# Patient Record
Sex: FEMALE | Race: BLACK OR AFRICAN AMERICAN | Employment: UNEMPLOYED | URBAN - METROPOLITAN AREA
[De-identification: names, ages, dates, MRNs, and addresses within clinical notes are randomized per-mention and may not be internally consistent; named-entity substitution may affect disease eponyms.]

---

## 2019-02-07 ENCOUNTER — OFFICE VISIT (OUTPATIENT)
Dept: URGENT CARE | Facility: CLINIC | Age: 44
End: 2019-02-07

## 2019-02-07 VITALS
WEIGHT: 128 LBS | RESPIRATION RATE: 18 BRPM | HEART RATE: 92 BPM | TEMPERATURE: 98 F | HEIGHT: 65 IN | DIASTOLIC BLOOD PRESSURE: 72 MMHG | BODY MASS INDEX: 21.33 KG/M2 | SYSTOLIC BLOOD PRESSURE: 106 MMHG | OXYGEN SATURATION: 100 %

## 2019-02-07 DIAGNOSIS — M54.42 LEFT-SIDED LOW BACK PAIN WITH LEFT-SIDED SCIATICA, UNSPECIFIED CHRONICITY: Primary | ICD-10-CM

## 2019-02-07 PROCEDURE — 99203 PR OFFICE/OUTPT VISIT, NEW, LEVL III, 30-44 MIN: ICD-10-PCS | Mod: 25,S$GLB,, | Performed by: PHYSICIAN ASSISTANT

## 2019-02-07 PROCEDURE — 99203 OFFICE O/P NEW LOW 30 MIN: CPT | Mod: 25,S$GLB,, | Performed by: PHYSICIAN ASSISTANT

## 2019-02-07 PROCEDURE — 96372 THER/PROPH/DIAG INJ SC/IM: CPT | Mod: S$GLB,,, | Performed by: PHYSICIAN ASSISTANT

## 2019-02-07 PROCEDURE — 96372 PR INJECTION,THERAP/PROPH/DIAG2ST, IM OR SUBCUT: ICD-10-PCS | Mod: S$GLB,,, | Performed by: PHYSICIAN ASSISTANT

## 2019-02-07 RX ORDER — CYCLOBENZAPRINE HCL 10 MG
10 TABLET ORAL NIGHTLY
Qty: 30 TABLET | Refills: 0 | Status: SHIPPED | OUTPATIENT
Start: 2019-02-07 | End: 2019-06-14

## 2019-02-07 RX ORDER — KETOROLAC TROMETHAMINE 30 MG/ML
30 INJECTION, SOLUTION INTRAMUSCULAR; INTRAVENOUS
Status: COMPLETED | OUTPATIENT
Start: 2019-02-07 | End: 2019-02-07

## 2019-02-07 RX ORDER — PREDNISONE 20 MG/1
40 TABLET ORAL DAILY
Qty: 10 TABLET | Refills: 0 | Status: SHIPPED | OUTPATIENT
Start: 2019-02-07 | End: 2019-02-12

## 2019-02-07 RX ORDER — METHOCARBAMOL 500 MG/1
1000 TABLET, FILM COATED ORAL 3 TIMES DAILY
Qty: 20 TABLET | Refills: 0 | Status: SHIPPED | OUTPATIENT
Start: 2019-02-07 | End: 2019-02-07

## 2019-02-07 RX ADMIN — KETOROLAC TROMETHAMINE 30 MG: 30 INJECTION, SOLUTION INTRAMUSCULAR; INTRAVENOUS at 02:02

## 2019-02-07 NOTE — PROGRESS NOTES
"Subjective:       Patient ID: Lea Springer is a 43 y.o. female.    Vitals:  height is 5' 5" (1.651 m) and weight is 58.1 kg (128 lb). Her temperature is 98.1 °F (36.7 °C). Her blood pressure is 106/72 and her pulse is 92. Her respiration is 18 and oxygen saturation is 100%.     Chief Complaint: Leg Pain (left thigh pain x 1 month )    Patient presents with  today.  is main historian and states that patient has been "overworking" herself at the gym and at work. Patient reports no prior injury, but "might not have fully recovered from a pulled muscle while exercising at the gym". Patient states that she used to be able to do squats and stretch everyday, but left lower back and leg pain has gotten worse. Patient states that she has been tolerating the pain by compensating on her right leg. Patient describes the pain as sharp that radiates from her left lower back down to the left leg. Pain is worse with sitting and walking, better with standing still. Patient reports numbness and tingling when seated for a long period of time. No fever, CP, SOB or leg swelling.      Leg Pain    The incident occurred more than 1 week ago. There was no injury mechanism. The pain is present in the left leg and left thigh. The pain is at a severity of 7/10. The pain is moderate. She reports no foreign bodies present. The symptoms are aggravated by movement and weight bearing. Treatments tried: motrin, baby aspirin. The treatment provided no relief.       Constitution: Negative for fatigue.   HENT: Negative for facial swelling and facial trauma.    Neck: Negative for neck stiffness.   Cardiovascular: Negative for chest trauma.   Eyes: Negative for eye trauma, double vision and blurred vision.   Gastrointestinal: Negative for abdominal trauma, abdominal pain and rectal bleeding.   Genitourinary: Negative for hematuria, missed menses, genital trauma and pelvic pain.   Musculoskeletal: Positive for pain. Negative for trauma, " joint swelling and abnormal ROM of joint.   Skin: Negative for color change, wound, abrasion, laceration and bruising.   Neurological: Negative for dizziness, history of vertigo, light-headedness, coordination disturbances, altered mental status and loss of consciousness.   Hematologic/Lymphatic: Negative for history of bleeding disorder.   Psychiatric/Behavioral: Negative for altered mental status.       Objective:      Physical Exam   Constitutional: She is oriented to person, place, and time. Vital signs are normal. She appears well-developed and well-nourished. She is active and cooperative. No distress.   HENT:   Head: Normocephalic and atraumatic.   Nose: Nose normal.   Mouth/Throat: Oropharynx is clear and moist and mucous membranes are normal.   Eyes: Conjunctivae and lids are normal.   Neck: Trachea normal, normal range of motion, full passive range of motion without pain and phonation normal. Neck supple.   Cardiovascular: Normal rate, regular rhythm, normal heart sounds, intact distal pulses and normal pulses.   Pulmonary/Chest: Effort normal and breath sounds normal. No accessory muscle usage or stridor. She has no decreased breath sounds. She has no wheezes. She has no rhonchi. She has no rales.   Abdominal: Soft. Normal appearance and bowel sounds are normal. She exhibits no abdominal bruit, no pulsatile midline mass and no mass.   Musculoskeletal: She exhibits no edema or deformity.        Lumbar back: She exhibits tenderness, pain and spasm. She exhibits normal range of motion, no bony tenderness, no swelling, no edema, no deformity and no laceration.        Left upper leg: She exhibits tenderness. She exhibits no bony tenderness, no swelling, no edema, no deformity and no laceration.        Left lower leg: She exhibits no bony tenderness, no swelling, no edema, no deformity and no laceration.   Lymphadenopathy:     She has no cervical adenopathy.   Neurological: She is alert and oriented to person,  place, and time. She has normal strength and normal reflexes. No cranial nerve deficit or sensory deficit. She displays a negative Romberg sign. Gait normal.   Skin: Skin is warm, dry and intact. She is not diaphoretic.   Psychiatric: She has a normal mood and affect. Her speech is normal and behavior is normal. Judgment and thought content normal. Cognition and memory are normal.   Nursing note and vitals reviewed.      Assessment:       1. Left-sided low back pain with left-sided sciatica, unspecified chronicity        Plan:         Left-sided low back pain with left-sided sciatica, unspecified chronicity  -     Ambulatory Referral to Back [facility] Spine Clinic    Other orders  -     ketorolac injection 30 mg  -     Discontinue: methocarbamol (ROBAXIN) 500 MG Tab; Take 2 tablets (1,000 mg total) by mouth 3 (three) times daily.  Dispense: 20 tablet; Refill: 0  -     cyclobenzaprine (FLEXERIL) 10 MG tablet; Take 1 tablet (10 mg total) by mouth every evening.  Dispense: 30 tablet; Refill: 0  -     predniSONE (DELTASONE) 20 MG tablet; Take 2 tablets (40 mg total) by mouth once daily. for 5 days  Dispense: 10 tablet; Refill: 0      Patient Instructions     If you were prescribed a narcotic or controlled medication, do not drive or operate heavy equipment or machinery while taking these medications.  You must understand that you've received an Urgent Care treatment only and that you may be released before all your medical problems are known or treated. You, the patient, will arrange for follow up care as instructed.  Follow up with your PCP or specialty clinic as directed in the next 1-2 weeks if not improved or as needed.  You can call [phone] to schedule an appointment with the appropriate provider.  If your condition worsens we recommend that you receive another evaluation at the emergency room immediately or contact your primary medical clinics after hours call service to discuss your concerns.  Please return here  or go to the Emergency Department for any concerns or worsening of condition.    Toradol shot given in clinic today for pain relief. Take Prednisone (steroid by mouth) as prescribed to reduce the inflammation. Please take OTC NSAID (Mobic or Naprosyn) as needed for pain relief. Take muscle relaxer as needed.     Referral to Back and Spine Clinic given to patient today and verbalized understanding. Patient is stable and in a comfortable standing position upon discharge.     Possible Causes of Low Back or Leg Pain    The symptoms in your back or leg may be due to pressure on a nerve. This pressure may be caused by a damaged disk or by abnormal bone growth. Either way, you may feel pain, burning, tingling, or numbness. If you have pressure on a nerve that connects to the sciatic nerve, pain may shoot down your leg.    Pressure from the disk  Constant wear and tear can weaken a disk over time and cause back pain. The disk can then be damaged by a sudden movement or injury. If its soft center begins to bulge, the disk may press on a nerve. Or the outside of the disk may tear, and the soft center may squeeze through and pinch a nerve.    Pressure from bone  As a disk wears out, the vertebrae right above and below the disk begin to touch. This can put pressure on a nerve. Often, abnormal bone (called bone spurs) grows where the vertebrae rub against each other. This can cause the foramen or the spinal canal to narrow (called stenosis) and press against a nerve.  Date Last Reviewed: 10/4/2015  © 8415-9983 [employer]. [address] [place] [address], [place] [address]. All rights reserved. This information is not intended as a substitute for professional medical care. Always follow your healthcare professional's instructions.        Self-Care for Low Back Pain    Most people have low back pain now and then. In many cases, it isnt serious and self-care can help. Sometimes low back pain can be a sign of a bigger  problem. Call your healthcare provider if your pain returns often or gets worse over time. For the long-term care of your back, get regular exercise, lose any excess weight and learn good posture.  Take a short rest  Lying down during the day may be beneficial for short periods of time if severe pain increases with sitting or standing. Long-term bed rest could be detrimental.  Reduce pain and swelling  Cold reduces swelling. Both cold and heat can reduce pain. Protect your skin by placing a towel between your body and the ice or heat source.  · For the first few days, apply an ice pack for 15 to 20 minutes .  · After the first few days, try heat for 15 minutes at a time to ease pain. Never sleep on a heating pad.  · Over-the-counter medicine can help control pain and swelling. Try aspirin or ibuprofen.  Exercise  Exercise can help your back heal. It also helps your back get stronger and more flexible, preventing any reinjury. Ask your healthcare provider about specific exercises for your back.  Use good posture to avoid reinjury  · When moving, bend at the hips and knees. Dont bend at the waist or twist around.  · When lifting, keep the object close to your body. Dont try to lift more than you can handle.  · When sitting, keep your lower back supported. Use a rolled-up towel as needed.  Seek immediate medical care if:  · Youre unable to stand or walk.  · You have a temperature over 100.4°F (38.0°C)  · You have frequent, painful, or bloody urination.  · You have severe abdominal pain.  · You have a sharp, stabbing pain.  · Your pain is constant.  · You have pain or numbness in your leg.  · You feel pain in a new area of your back.  · You notice that the pain isnt decreasing after more than a week.   Date Last Reviewed: 9/29/2015  © 3246-8625 [employer]. [address] [place] [address], [place] [address]. All rights reserved. This information is not intended as a substitute for professional medical care.  Always follow your healthcare professional's instructions.

## 2019-02-07 NOTE — PATIENT INSTRUCTIONS
If you were prescribed a narcotic or controlled medication, do not drive or operate heavy equipment or machinery while taking these medications.  You must understand that you've received an Urgent Care treatment only and that you may be released before all your medical problems are known or treated. You, the patient, will arrange for follow up care as instructed.  Follow up with your PCP or specialty clinic as directed in the next 1-2 weeks if not improved or as needed.  You can call [phone] to schedule an appointment with the appropriate provider.  If your condition worsens we recommend that you receive another evaluation at the emergency room immediately or contact your primary medical clinics after hours call service to discuss your concerns.  Please return here or go to the Emergency Department for any concerns or worsening of condition.    Toradol shot given in clinic today for pain relief. Take Prednisone (steroid by mouth) as prescribed to reduce the inflammation. Please take OTC NSAID (Mobic or Naprosyn) as needed for pain relief. Take muscle relaxer as needed.     Referral to Back and Spine Clinic given to patient today and verbalized understanding. Patient is stable and in a comfortable standing position upon discharge.     Possible Causes of Low Back or Leg Pain    The symptoms in your back or leg may be due to pressure on a nerve. This pressure may be caused by a damaged disk or by abnormal bone growth. Either way, you may feel pain, burning, tingling, or numbness. If you have pressure on a nerve that connects to the sciatic nerve, pain may shoot down your leg.    Pressure from the disk  Constant wear and tear can weaken a disk over time and cause back pain. The disk can then be damaged by a sudden movement or injury. If its soft center begins to bulge, the disk may press on a nerve. Or the outside of the disk may tear, and the soft center may squeeze through and pinch a nerve.    Pressure from  bone  As a disk wears out, the vertebrae right above and below the disk begin to touch. This can put pressure on a nerve. Often, abnormal bone (called bone spurs) grows where the vertebrae rub against each other. This can cause the foramen or the spinal canal to narrow (called stenosis) and press against a nerve.  Date Last Reviewed: 10/4/2015  © 5247-9766 [employer]. [address] [place] [address], [place] [address]. All rights reserved. This information is not intended as a substitute for professional medical care. Always follow your healthcare professional's instructions.        Self-Care for Low Back Pain    Most people have low back pain now and then. In many cases, it isnt serious and self-care can help. Sometimes low back pain can be a sign of a bigger problem. Call your healthcare provider if your pain returns often or gets worse over time. For the long-term care of your back, get regular exercise, lose any excess weight and learn good posture.  Take a short rest  Lying down during the day may be beneficial for short periods of time if severe pain increases with sitting or standing. Long-term bed rest could be detrimental.  Reduce pain and swelling  Cold reduces swelling. Both cold and heat can reduce pain. Protect your skin by placing a towel between your body and the ice or heat source.  · For the first few days, apply an ice pack for 15 to 20 minutes .  · After the first few days, try heat for 15 minutes at a time to ease pain. Never sleep on a heating pad.  · Over-the-counter medicine can help control pain and swelling. Try aspirin or ibuprofen.  Exercise  Exercise can help your back heal. It also helps your back get stronger and more flexible, preventing any reinjury. Ask your healthcare provider about specific exercises for your back.  Use good posture to avoid reinjury  · When moving, bend at the hips and knees. Dont bend at the waist or twist around.  · When lifting, keep the object close to  your body. Dont try to lift more than you can handle.  · When sitting, keep your lower back supported. Use a rolled-up towel as needed.  Seek immediate medical care if:  · Youre unable to stand or walk.  · You have a temperature over 100.4°F (38.0°C)  · You have frequent, painful, or bloody urination.  · You have severe abdominal pain.  · You have a sharp, stabbing pain.  · Your pain is constant.  · You have pain or numbness in your leg.  · You feel pain in a new area of your back.  · You notice that the pain isnt decreasing after more than a week.   Date Last Reviewed: 9/29/2015  © 3439-7487 [employer]. [address] [place] [address], [place] [address]. All rights reserved. This information is not intended as a substitute for professional medical care. Always follow your healthcare professional's instructions.

## 2019-02-12 ENCOUNTER — TELEPHONE (OUTPATIENT)
Dept: SPINE | Facility: CLINIC | Age: 44
End: 2019-02-12

## 2019-02-12 DIAGNOSIS — M54.5 LOW BACK PAIN, UNSPECIFIED BACK PAIN LATERALITY, UNSPECIFIED CHRONICITY, WITH SCIATICA PRESENCE UNSPECIFIED: Primary | ICD-10-CM

## 2019-03-28 ENCOUNTER — OFFICE VISIT (OUTPATIENT)
Dept: SPINE | Facility: CLINIC | Age: 44
End: 2019-03-28
Payer: MEDICAID

## 2019-03-28 VITALS
TEMPERATURE: 99 F | SYSTOLIC BLOOD PRESSURE: 125 MMHG | HEIGHT: 65 IN | BODY MASS INDEX: 21.33 KG/M2 | HEART RATE: 74 BPM | WEIGHT: 128 LBS | DIASTOLIC BLOOD PRESSURE: 58 MMHG

## 2019-03-28 DIAGNOSIS — M54.16 LEFT LUMBAR RADICULITIS: Primary | ICD-10-CM

## 2019-03-28 PROCEDURE — 99999 PR PBB SHADOW E&M-EST. PATIENT-LVL IV: CPT | Mod: PBBFAC,,, | Performed by: PHYSICIAN ASSISTANT

## 2019-03-28 PROCEDURE — 99204 PR OFFICE/OUTPT VISIT, NEW, LEVL IV, 45-59 MIN: ICD-10-PCS | Mod: S$PBB,,, | Performed by: PHYSICIAN ASSISTANT

## 2019-03-28 PROCEDURE — 99204 OFFICE O/P NEW MOD 45 MIN: CPT | Mod: S$PBB,,, | Performed by: PHYSICIAN ASSISTANT

## 2019-03-28 PROCEDURE — 99999 PR PBB SHADOW E&M-EST. PATIENT-LVL IV: ICD-10-PCS | Mod: PBBFAC,,, | Performed by: PHYSICIAN ASSISTANT

## 2019-03-28 PROCEDURE — 99214 OFFICE O/P EST MOD 30 MIN: CPT | Mod: PBBFAC | Performed by: PHYSICIAN ASSISTANT

## 2019-03-28 RX ORDER — ORPHENADRINE CITRATE 100 MG/1
100 TABLET, EXTENDED RELEASE ORAL 2 TIMES DAILY PRN
Qty: 20 TABLET | Refills: 0 | Status: SHIPPED | OUTPATIENT
Start: 2019-03-28 | End: 2019-04-07

## 2019-03-28 RX ORDER — MELOXICAM 7.5 MG/1
TABLET ORAL
COMMUNITY
Start: 2019-03-07

## 2019-03-28 RX ORDER — TRAMADOL HYDROCHLORIDE 50 MG/1
50 TABLET ORAL EVERY 8 HOURS PRN
Qty: 30 TABLET | Refills: 0 | Status: SHIPPED | OUTPATIENT
Start: 2019-03-28 | End: 2019-04-07

## 2019-03-28 NOTE — PROGRESS NOTES
Subjective:      Patient ID: Lea Springer is a 43 y.o. female.    Chief Complaint: Leg Pain      HPI  ([place])    She is otherwise healthy.     Seen by UC on 2/7/19 for back pain. Given flexeril and prednisone. No relief with the po prednisone. Had 1 day relief with toradol injection. Was seen in outside ED and had XR (told it was normal). She was given mobic 7.5 and norflex- these helped a little. Was given dose pack on 3/21/19 with no improvement.     Woke up with left leg pain in August of last year- this was intermittent. Pain got progressively worse. Now with constant left buttock and posterior leg pain mostly to behind her knee. No LBP or right leg pain. Pain is worse with sitting/driving. Pain also worse at night. Pain is better with leaning forward and laying on her stomach. She rates her pain as a 9 on a scale of 1-10. No known injury. No previous back issues. Pain is stabbing and she has spasms. No numbness, tingling, or weakness.     No PT, injections or surgery on her back. Medications as above.       Review of Systems   Constitution: Negative for fever, malaise/fatigue, night sweats, weight gain and weight loss.   HENT: Negative for hearing loss, nosebleeds and odynophagia.    Eyes: Positive for blurred vision. Negative for double vision.   Cardiovascular: Negative for chest pain, irregular heartbeat and palpitations.   Respiratory: Negative for cough, hemoptysis, shortness of breath and wheezing.    Endocrine: Negative for cold intolerance and polydipsia.   Hematologic/Lymphatic: Does not bruise/bleed easily.   Skin: Negative for dry skin, poor wound healing, rash and suspicious lesions.   Musculoskeletal:        See HPI for pertinent positives.   Gastrointestinal: Positive for nausea. Negative for bloating, abdominal pain, constipation, diarrhea, hematochezia, melena and vomiting.   Genitourinary: Negative for bladder incontinence, dysuria, hematuria, hesitancy and incomplete emptying.    Neurological: Negative for disturbances in coordination, dizziness, focal weakness, headaches, loss of balance, numbness, paresthesias, seizures and weakness.   Psychiatric/Behavioral: Negative for depression and hallucinations. The patient is not nervous/anxious.            Objective:        General: Lea is well-developed, well-nourished, appears stated age, in no acute distress, alert and oriented to time, place and person.     General    Vitals reviewed.  Constitutional: She is oriented to person, place, and time. She appears well-developed and well-nourished.   Pulmonary/Chest: Effort normal.   Abdominal: She exhibits no distension.   Neurological: She is alert and oriented to person, place, and time.   Psychiatric: She has a normal mood and affect. Her behavior is normal. Judgment and thought content normal.   She is very tearful throughout interview and exam.           Gait: normal, tandem walking is normal and she is able to heel/toe stand.     On exam of the lumbar spine, Inspection of back is normal, No tenderness noted, no left buttock tenderness.     Skin in lumbar region is warm to the touch without visible rashes.     muscle tone normal without spasm, limited range of motion with pain  Pain in flexion.    Strength testing of the bilateral LEs shows  Right hip abduction:  +5/5  Left hip abduction:  +5/5  Right hip flexion:  +5/5  Left hip flexion:  +5/5  Right hip extensors:  +5/5  Left hip extensors:  +5/5  Right quadriceps:  +5/5  Left quadriceps:  +5/5  Right hamstring:  +5/5  Left hamstring:  +5/5  Right dorsiflexion:  +5/5  Left dorsiflexion:  +5/5  Right plantar flexion:  +5/5  Left plantar flexion:  +5/5   Right EHL:  +5/5   Left EHL:  +5/5    negative clonus of bilateral LEs.     positive straight leg raise on left LE with worsening left leg pain. Negative SLR right LE.     DTRs:  Right patellar:  +2     Left patellar:  +2  Right achilles:  +2   Left achilles:  +2    Sensation is grossly  intact in L2, L3, L4, L5, and S1 distribution.    Right hip has no pain with IR/ER. Left hip has no pain with IR/ER.      On exam of bilateral UEs, patient has full painfree ROM with no signs of clubbing, laxity, cyanosis, edema, instability, weakness, or tenderness.         Assessment:       1. Left lumbar radiculitis           Plan:       Orders Placed This Encounter    MRI LUMBAR SPINE WITHOUT CONTRAST    orphenadrine (NORFLEX) 100 mg tablet    traMADol (ULTRAM) 50 mg tablet       8 month history of constant left buttock and posterior leg pain mostly to behind her knee. No LBP or right leg pain. No imaging available. Was told by outside ED that XRs were normal. Pain appears to be radicular in nature. No improvement with medrol dose pack, prednisone, or time. Treatment options reviewed with patient and following plan made:     - MRI of lumbar spine to further evaluate left LE radiculitis.   - Continue norflex prn. Given refill.   - Take mobic 7.5, but can increase to 15mg daily with food. Let me know if/when she needs refill of 15mg.   - Limited prescription for ultram to use for pain.  reviewed and appropriate.   - Depending on MRI, will likely consider injections with pain management.   - Consider PT once pain is improved.   - Will call with MRI results/further plan.   - She has medicaid pending. If she gets medicaid, it will be difficult to get injections. Will follow.     ADDENDUM 4/4/19:  MRI of lumbar spine dated 4/3/19 is personally reviewed and shows disc bulge/facet hypertrophy L4-L5 with mild right foraminal stenosis. Left sided disc L5-S1 in lateral recess that abuts left S1 nerve. Possible fibroids in uterus per radiology.     Left leg pain likely from disc at L5-S1. Recommend left S1 TF SERENITY with pain management. She was advised and wants to proceed. Recommend she f/u with OB/GYN regarding possible uterine fibroids.     Of note, her medicaid is pending and she is under [facility] financial  assistance.     Follow-up: Follow up if symptoms worsen or fail to improve. If there are any questions prior to this, the patient was instructed to contact the office.

## 2019-03-28 NOTE — LETTER
March 28, 2019      Anna Marlow PA-C  [address] [facility] [address]           [facility]  [address], [address]  [facility] [address]  Phone: [phone]  Fax: [phone]          Patient: Lea Springer   MR Number: [id number]   YOB: 1975   Date of Visit: 3/28/2019       Dear Anna Marlow:    Thank you for referring Lea Springer to me for evaluation. Attached you will find relevant portions of my assessment and plan of care.    If you have questions, please do not hesitate to call me. I look forward to following Lea Springer along with you.    Sincerely,    Ynes Olea PA-C    Enclosure  CC:  No Recipients    If you would like to receive this communication electronically, please contact [email or web address] or [phone] to request more information on [employer] access.    For providers and/or their staff who would like to refer a patient to [facility], please contact us through our one-stop-shop provider referral line, [facility], at [phone].    If you feel you have received this communication in error or would no longer like to receive these types of communications, please e-mail [email or web address]

## 2019-03-28 NOTE — PATIENT INSTRUCTIONS
It was a pleasure to meet you today!     I think your left leg pain is coming from your back. I recommend we get an MRI of your lower back to look at this more closely. Depending on what the MRI shows, we may be able to do injections (epidural steroid) in your back.     Take these medications:   - mobic 7.5 from ER- you can take 2 of these (15mg) once a day with food. Let me know if you need a refill.   - norflex twice a day as needed for muscle relaxers. I sent a refill of this to your pharmacy.   - ultram/tramadol every 8 hours as needed for pain. This is a pain medication.     We will call you the day of or the day after your MRI to discuss results. Let me know if you need anything else. I think we should be able to get your pain better.     Ynes

## 2019-04-03 ENCOUNTER — HOSPITAL ENCOUNTER (OUTPATIENT)
Dept: RADIOLOGY | Facility: OTHER | Age: 44
Discharge: HOME | End: 2019-04-03
Attending: PHYSICIAN ASSISTANT
Payer: MEDICAID

## 2019-04-03 ENCOUNTER — TELEPHONE (OUTPATIENT)
Dept: SPINE | Facility: CLINIC | Age: 44
End: 2019-04-03

## 2019-04-03 DIAGNOSIS — M54.16 LEFT LUMBAR RADICULITIS: ICD-10-CM

## 2019-04-03 DIAGNOSIS — M51.26 LUMBAR DISC DISPLACEMENT WITHOUT MYELOPATHY: Primary | ICD-10-CM

## 2019-04-03 DIAGNOSIS — M54.5 LOW BACK PAIN, UNSPECIFIED BACK PAIN LATERALITY, UNSPECIFIED CHRONICITY, WITH SCIATICA PRESENCE UNSPECIFIED: ICD-10-CM

## 2019-04-03 PROCEDURE — 72148 MRI LUMBAR SPINE W/O DYE: CPT | Mod: 26,,, | Performed by: RADIOLOGY

## 2019-04-03 PROCEDURE — 72148 MRI LUMBAR SPINE WITHOUT CONTRAST: ICD-10-PCS | Mod: 26,,, | Performed by: RADIOLOGY

## 2019-04-03 PROCEDURE — 72148 MRI LUMBAR SPINE W/O DYE: CPT | Mod: TC

## 2019-04-03 NOTE — TELEPHONE ENCOUNTER
----- Message from Ynes Olea PA-C sent at 4/3/2019  2:07 PM CDT -----  Please call and let her know that MRI shows disc is causing her left leg pain. Recommend lumbar SERENITY with pain management. Let me know if she wants me to try to set this up.     Has she heard back from medicaid? What is her insurance status?     Thanks.

## 2019-04-03 NOTE — TELEPHONE ENCOUNTER
Patient informed per CRISS Torres note below.  Patient states she has not heard anything from Medicaid.  Her insurance status is none.  Patient would like to proceed with SERENITY.  Please advise.

## 2019-04-04 ENCOUNTER — TELEPHONE (OUTPATIENT)
Dept: PAIN MEDICINE | Facility: CLINIC | Age: 44
End: 2019-04-04

## 2019-04-04 DIAGNOSIS — M54.16 LEFT LUMBAR RADICULITIS: Primary | ICD-10-CM

## 2019-04-04 NOTE — TELEPHONE ENCOUNTER
Order put in for SERENITY with pain management. Please call and let her know they will call her to se up injection.     Please advise her that MRI also showed possible fibroids in her uterus- she needs to f/u with an OB/GYN regarding this.     Please make her a f/u with me in 6-8 weeks.

## 2019-04-17 ENCOUNTER — HOSPITAL ENCOUNTER (OUTPATIENT)
Facility: OTHER | Age: 44
Discharge: HOME | End: 2019-04-17
Attending: ANESTHESIOLOGY | Admitting: ANESTHESIOLOGY
Payer: MEDICAID

## 2019-04-17 VITALS
HEIGHT: 60 IN | SYSTOLIC BLOOD PRESSURE: 104 MMHG | HEART RATE: 68 BPM | OXYGEN SATURATION: 98 % | BODY MASS INDEX: 24.74 KG/M2 | WEIGHT: 126 LBS | DIASTOLIC BLOOD PRESSURE: 61 MMHG | TEMPERATURE: 98 F | RESPIRATION RATE: 18 BRPM

## 2019-04-17 DIAGNOSIS — M54.16 LUMBAR RADICULOPATHY: Primary | ICD-10-CM

## 2019-04-17 PROCEDURE — 25000003 PHARM REV CODE 250: Performed by: ANESTHESIOLOGY

## 2019-04-17 PROCEDURE — 64483 PR EPIDURAL INJ, ANES/STEROID, TRANSFORAMINAL, LUMB/SACR, SNGL LEVL: ICD-10-PCS | Mod: LT,,, | Performed by: ANESTHESIOLOGY

## 2019-04-17 PROCEDURE — 99152 MOD SED SAME PHYS/QHP 5/>YRS: CPT | Mod: ,,, | Performed by: ANESTHESIOLOGY

## 2019-04-17 PROCEDURE — 63600175 PHARM REV CODE 636 W HCPCS: Performed by: ANESTHESIOLOGY

## 2019-04-17 PROCEDURE — 64483 NJX AA&/STRD TFRM EPI L/S 1: CPT | Mod: LT,,, | Performed by: ANESTHESIOLOGY

## 2019-04-17 PROCEDURE — 25000003 PHARM REV CODE 250: Performed by: PHYSICAL MEDICINE & REHABILITATION

## 2019-04-17 PROCEDURE — 64483 NJX AA&/STRD TFRM EPI L/S 1: CPT | Performed by: ANESTHESIOLOGY

## 2019-04-17 PROCEDURE — 99152 PR MOD CONSCIOUS SEDATION, SAME PHYS, 5+ YRS, FIRST 15 MIN: ICD-10-PCS | Mod: ,,, | Performed by: ANESTHESIOLOGY

## 2019-04-17 RX ORDER — MIDAZOLAM HYDROCHLORIDE 1 MG/ML
INJECTION INTRAMUSCULAR; INTRAVENOUS
Status: DISCONTINUED | OUTPATIENT
Start: 2019-04-17 | End: 2019-04-17 | Stop reason: HOSPADM

## 2019-04-17 RX ORDER — FENTANYL CITRATE 50 UG/ML
INJECTION, SOLUTION INTRAMUSCULAR; INTRAVENOUS
Status: DISCONTINUED | OUTPATIENT
Start: 2019-04-17 | End: 2019-04-17 | Stop reason: HOSPADM

## 2019-04-17 RX ORDER — SODIUM CHLORIDE 9 MG/ML
500 INJECTION, SOLUTION INTRAVENOUS CONTINUOUS
Status: DISCONTINUED | OUTPATIENT
Start: 2019-04-17 | End: 2019-04-17 | Stop reason: HOSPADM

## 2019-04-17 RX ORDER — LIDOCAINE HYDROCHLORIDE 10 MG/ML
INJECTION INFILTRATION; PERINEURAL
Status: DISCONTINUED | OUTPATIENT
Start: 2019-04-17 | End: 2019-04-17 | Stop reason: HOSPADM

## 2019-04-17 RX ORDER — LIDOCAINE HYDROCHLORIDE 5 MG/ML
INJECTION, SOLUTION INFILTRATION; INTRAVENOUS
Status: DISCONTINUED | OUTPATIENT
Start: 2019-04-17 | End: 2019-04-17 | Stop reason: HOSPADM

## 2019-04-17 RX ORDER — DEXAMETHASONE SODIUM PHOSPHATE 4 MG/ML
INJECTION, SOLUTION INTRA-ARTICULAR; INTRALESIONAL; INTRAMUSCULAR; INTRAVENOUS; SOFT TISSUE
Status: DISCONTINUED | OUTPATIENT
Start: 2019-04-17 | End: 2019-04-17 | Stop reason: HOSPADM

## 2019-04-17 RX ADMIN — SODIUM CHLORIDE 500 ML: 0.9 INJECTION, SOLUTION INTRAVENOUS at 01:04

## 2019-04-17 NOTE — DISCHARGE SUMMARY
Discharge Note  Short Stay      SUMMARY     Admit Date: 4/17/2019    Attending Physician: Thad Odell      Discharge Physician: Tahd Odell      Discharge Date: 4/17/2019 2:12 PM    Procedure(s) (LRB):  Injection, Steroid, Epidural LUMBAR TRANSFORAMINAL LEFT S1 TF SERENITY (Left)    Final Diagnosis: Left lumbar radiculitis [M54.16]    Disposition: Home or self care    Patient Instructions:   Current Discharge Medication List      CONTINUE these medications which have NOT CHANGED    Details   cyclobenzaprine (FLEXERIL) 10 MG tablet Take 1 tablet (10 mg total) by mouth every evening.  Qty: 30 tablet, Refills: 0      meloxicam (MOBIC) 7.5 MG tablet Take 1 pill 2 times a day                 Discharge Diagnosis: Left lumbar radiculitis [M54.16]  Condition on Discharge: Stable with no complications to procedure   Diet on Discharge: Same as before.  Activity: as per instruction sheet.  Discharge to: Home with a responsible adult.  Follow up: 2-4 weeks

## 2019-04-17 NOTE — DISCHARGE INSTRUCTIONS
Thank you for allowing us to care for you today. You may receive a survey about the care we provided. Your feedback is valuable and helps us provide excellent care throughout the community.     Home Care Instructions for Pain Management:    1. DIET:   You may resume your normal diet today.   2. BATHING:   You may shower with luke warm water. No tub baths or anything that will soak injection sites under water for the next 24 hours.  3. DRESSING:   You may remove your bandage today.   4. ACTIVITY LEVEL:   You may resume your normal activities 24 hrs after your procedure. Nothing strenuous today.  5. MEDICATIONS:   You may resume your normal medications today. To restart blood thinners, ask your doctor.  6. DRIVING    If you have received any sedatives by mouth today, you may not drive for 12 hours.    If you have received any sedation through your IV, you may not drive for 24 hrs.   7. SPECIAL INSTRUCTIONS:   No heat to the injection site for 24 hrs including, hot bath or shower, heating pad, moist heat, or hot tubs.    Use ice pack to injection site for any pain or discomfort.  Apply ice packs for 20 minute intervals as needed.    IF you have diabetes, be sure to monitor your blood sugar more closely. IF your injection contained steroids your blood sugar levels may become higher than normal.    If you are still having pain upon discharge:  Your pain may improve over the next 48 hours. The anesthetic (numbing medication) works immediately to 48 hours. IF your injection contained a steroid (anti-inflammatory medication), it takes approximately 3 days to start feeling relief and 7-10 days to see your greatest results from the medication. It is possible you may need subsequent injections. This would be discussed at your follow up appointment with pain management or your referring doctor.      PLEASE CALL YOUR DOCTOR IF:  1. Redness or swelling around the injection site.  2. Fever of 101 degrees or more  3. Drainage  (pus) from the injection site.  4. For any continuous bleeding (some dried blood over the incision is normal.)    FOR EMERGENCIES:   If any unusual problems or difficulties occur during clinic hours, call [phone] or [phone].    Thank you for allowing us to care for you today. You may receive a survey about the care we provided. Your feedback is valuable and helps us provide excellent care throughout the community.     Home Care Instructions for Pain Management:    1. DIET:   You may resume your normal diet today.   2. BATHING:   You may shower with luke warm water. No tub baths or anything that will soak injection sites under water for the next 24 hours.  3. DRESSING:   You may remove your bandage today.   4. ACTIVITY LEVEL:   You may resume your normal activities 24 hrs after your procedure. Nothing strenuous today.  5. MEDICATIONS:   You may resume your normal medications today. To restart blood thinners, ask your doctor.  6. DRIVING    If you have received any sedatives by mouth today, you may not drive for 12 hours.    If you have received any sedation through your IV, you may not drive for 24 hrs.   7. SPECIAL INSTRUCTIONS:   No heat to the injection site for 24 hrs including, hot bath or shower, heating pad, moist heat, or hot tubs.    Use ice pack to injection site for any pain or discomfort.  Apply ice packs for 20 minute intervals as needed.    IF you have diabetes, be sure to monitor your blood sugar more closely. IF your injection contained steroids your blood sugar levels may become higher than normal.    If you are still having pain upon discharge:  Your pain may improve over the next 48 hours. The anesthetic (numbing medication) works immediately to 48 hours. IF your injection contained a steroid (anti-inflammatory medication), it takes approximately 3 days to start feeling relief and 7-10 days to see your greatest results from the medication. It is possible you may need subsequent injections. This  would be discussed at your follow up appointment with pain management or your referring doctor.      PLEASE CALL YOUR DOCTOR IF:  1. Redness or swelling around the injection site.  2. Fever of 101 degrees or more  3. Drainage (pus) from the injection site.  4. For any continuous bleeding (some dried blood over the incision is normal.)    FOR EMERGENCIES:   If any unusual problems or difficulties occur during clinic hours, call [phone] or [phone]. Adult Procedural Sedation Instructions    Recovery After Procedural Sedation (Adult)  You have been given medicine by vein to make you sleep during your surgery. This may have included both a pain medicine and sleeping medicine. Most of the effects have worn off. But you may still have some drowsiness for the next 6 to 8 hours.  Home care  Follow these guidelines when you get home:  · For the next 8 hours, you should be watched by a responsible adult. This person should make sure your condition is not getting worse.  · Don't drink any alcohol for the next 24 hours.  · Don't drive, operate dangerous machinery, or make important business or personal decisions during the next 24 hours.  Note: Your healthcare provider may tell you not to take any medicine by mouth for pain or sleep in the next 4 hours. These medicines may react with the medicines you were given in the hospital. This could cause a much stronger response than usual.  Follow-up care  Follow up with your healthcare provider if you are not alert and back to your usual level of activity within 12 hours.  When to seek medical advice  Call your healthcare provider right away if any of these occur:  · Drowsiness gets worse  · Weakness or dizziness gets worse  · Repeated vomiting  · You can't be awakened   Date Last Reviewed: 10/18/2016  © 0838-0533 [employer]. [address] [place] [address], [place] [address]. All rights reserved. This information is not intended as a substitute for professional medical care.  Always follow your healthcare professional's instructions.

## 2019-04-17 NOTE — OP NOTE
Patient Name: Lea Springer  MRN: [id number]    INFORMED CONSENT: The procedure, risks, benefits and options were discussed with patient. There are no contraindications to the procedure. The patient expressed understanding and agreed to proceed. The personnel performing the procedure was discussed. I verify that I personally obtained Lea's consent prior to the start of the procedure and the signed consent can be found on the patient's chart.    Procedure Date: 04/17/2019    Anesthesia: Topical    Pre Procedure diagnosis: Left lumbar radiculitis [M54.16]  1. Lumbar radiculopathy    2.      DDD  Post-Procedure diagnosis: SAME      Sedation: Yes - Fentanyl 100 mcg and Midazolam 2 mg    PROCEDURE:Left S1 TRANSFORAMINAL EPIDURAL STEROID INJECTION        DESCRIPTION OF PROCEDURE: The patient was brought to the procedure room. After performing time out IV access was obtained prior to the procedure. The patient was positioned prone on the fluoroscopy table. Continuous hemodynamic monitoring was initiated including blood pressure, EKG, and pulse oximetry. . The skin was prepped with chlorhexidine three times and draped in a sterile fashion. Skin anesthesia was achieved using 3 mL of lidocaine 1% over the respective injection site.     An oblique fluoroscopic view was obtained, with the superior articular process of the inferior vertebral body aligned with the pedicle. The tip of a 22-gauge 3.5-inch Quincke-type spinal needle was advanced toward the 6 oclock position of the pedicle under intermittent fluoroscopic guidance. Confirmation of proper needle position was made with AP, oblique, and lateral fluoroscopic views. Negative aspiration for blood or CSF was confirmed. 2 mL of Omnipaque 300 was injected. Live fluoroscopic imaging revealed a clear outline of the spinal nerve with proximal spread of agent through the neural foramen into the anterior epidural space. A total combination of 3 mL of Lidocaine 0.5% and 10 mg  decadron was injected at each level. Contrast spread was noted from L5 to S1 level. There was no pain on injection. The needle was removed and bleeding was nil.  A sterile dressing was applied. Lea was taken back to the recovery room for further observation.     Blood Loss: Nill  Specimen: None    Thad Odell MD

## 2019-06-04 NOTE — PROGRESS NOTES
Subjective:      Patient ID: Lea Springer is a 44 y.o. female.    Chief Complaint: Leg Pain      HPI  ([place])    She is otherwise healthy.     Known disc bulge/facet hypertrophy L4-L5 with mild right foraminal stenosis and left sided disc L5-S1 in lateral recess that abuts left S1 nerve. Possible fibroids in uterus per radiology.     She had left S1 TF SERENITY on 4/17/19 by Dr. Odell. She was advised to f/u with OB/GYN regarding possible fibroids. As of last visit, her medicaid was pending and she was under [facility] financial assistance.     She is here for follow up.     Her outside OB/GYN said nothing further needed done for fibroids. She was approved for medicaid.     Her pain is at least 75% better since above injection. She continues with intermittent left buttock and posterior leg pain mostly to behind her knee that is not as severe as prior to injection. No LBP or right leg pain. Pain is worse with sitting/driving. Pain also worse at night. She rates her pain as a 4 on a scale of 1-10, was a 9 at the last visit. Pain is more of an aching twinge. She was able to walk yesterday at the park and is a little more sore today. No numbness, tingling, or weakness. She continues on prn mobic, ultram, and flexeril.       Review of Systems   Constitution: Negative for chills, fever, night sweats and weight gain.   Gastrointestinal: Negative for bowel incontinence, nausea and vomiting.   Genitourinary: Negative for bladder incontinence.   Neurological: Negative for disturbances in coordination and loss of balance.           Objective:        General: Lea is well-developed, well-nourished, appears stated age, in no acute distress, alert and oriented to time, place and person.     Ortho/SPM Exam     Patient sits comfortably in the exam room and answers questions appropriately. Grossly patient is able to move bilateral LEs without difficulty. Ambulates normally.     Strength testing of the bilateral LEs shows  Right hip  abduction:  +5/5  Left hip abduction:  +5/5  Right hip flexion:  +5/5   Left hip flexion:  +5/5  Right hip extensors:  +5/5  Left hip extensors:  +5/5  Right quadriceps:  +5/5  Left quadriceps:  +5/5  Right hamstring:  +5/5  Left hamstring:  +5/5  Right dorsiflexion:   +5/5  Left dorsiflexion:  +5/5  Right plantar flexion:  +5/5  Left plantar flexion:  +5/5   Right EHL:  +5/5   Left EHL:  +5/5    Sensation is grossly intact to light touch.         Assessment:       1. Left lumbar radiculitis    2. Lumbar disc displacement without myelopathy    3. Left-sided low back pain with left-sided sciatica, unspecified chronicity           Plan:       Orders Placed This Encounter    Ambulatory referral to Physical Therapy - Lumbar       Good improvement in left buttock and posterior leg pain mostly to behind her knee s/p SERENITY. No LBP or right leg pain. Pain is not as severe and no longer constant. Known disc bulge/facet hypertrophy L4-L5 with mild right foraminal stenosis and left sided disc L5-S1 in lateral recess that abuts left S1 nerve. Treatment options reviewed with patient and following plan made:     - PT for lumbar spine with good HEP. Internal orders done for [facility].   - Continue prn ultram, flexeril, mobic. No refills needed.   - If she needs further SERENITY it will be difficult to do as she now has medicaid.   - She would like to meet with a surgeon to discuss possible surgery. Appointment made with Dr. Rosales.     Follow-up: Follow up in about 6 weeks (around 7/18/2019). If there are any questions prior to this, the patient was instructed to contact the office.

## 2019-06-06 ENCOUNTER — OFFICE VISIT (OUTPATIENT)
Dept: SPINE | Facility: CLINIC | Age: 44
End: 2019-06-06
Payer: MEDICAID

## 2019-06-06 ENCOUNTER — PATIENT MESSAGE (OUTPATIENT)
Dept: SPINE | Facility: CLINIC | Age: 44
End: 2019-06-06

## 2019-06-06 VITALS
HEART RATE: 86 BPM | DIASTOLIC BLOOD PRESSURE: 54 MMHG | WEIGHT: 126 LBS | SYSTOLIC BLOOD PRESSURE: 101 MMHG | HEIGHT: 60 IN | BODY MASS INDEX: 24.74 KG/M2

## 2019-06-06 DIAGNOSIS — M54.42 LEFT-SIDED LOW BACK PAIN WITH LEFT-SIDED SCIATICA, UNSPECIFIED CHRONICITY: ICD-10-CM

## 2019-06-06 DIAGNOSIS — M51.26 LUMBAR DISC DISPLACEMENT WITHOUT MYELOPATHY: ICD-10-CM

## 2019-06-06 DIAGNOSIS — M54.16 LEFT LUMBAR RADICULITIS: Primary | ICD-10-CM

## 2019-06-06 PROCEDURE — 99999 PR PBB SHADOW E&M-EST. PATIENT-LVL IV: ICD-10-PCS | Mod: PBBFAC,,, | Performed by: PHYSICIAN ASSISTANT

## 2019-06-06 PROCEDURE — 99999 PR PBB SHADOW E&M-EST. PATIENT-LVL IV: CPT | Mod: PBBFAC,,, | Performed by: PHYSICIAN ASSISTANT

## 2019-06-06 PROCEDURE — 99214 OFFICE O/P EST MOD 30 MIN: CPT | Mod: PBBFAC | Performed by: PHYSICIAN ASSISTANT

## 2019-06-06 PROCEDURE — 99214 OFFICE O/P EST MOD 30 MIN: CPT | Mod: S$PBB,,, | Performed by: PHYSICIAN ASSISTANT

## 2019-06-06 PROCEDURE — 99214 PR OFFICE/OUTPT VISIT, EST, LEVL IV, 30-39 MIN: ICD-10-PCS | Mod: S$PBB,,, | Performed by: PHYSICIAN ASSISTANT

## 2019-06-06 NOTE — PATIENT INSTRUCTIONS
It was good to see you again today.     I am glad you are feeling better.     I sent some orders for physical therapy at [facility]. They will call you to set up. Continue on tramadol, flexeril, and mobic as needed. Continue with walking as tolerated. Start slow and go up from there.     I will have you see Dr. Rosales to discuss possible surgery options. Email me if you need anything.     Ynes

## 2019-06-14 ENCOUNTER — OFFICE VISIT (OUTPATIENT)
Dept: ORTHOPEDICS | Facility: CLINIC | Age: 44
End: 2019-06-14
Payer: MEDICAID

## 2019-06-14 ENCOUNTER — HOSPITAL ENCOUNTER (OUTPATIENT)
Dept: RADIOLOGY | Facility: HOSPITAL | Age: 44
Discharge: HOME | End: 2019-06-14
Attending: PHYSICIAN ASSISTANT
Payer: MEDICAID

## 2019-06-14 VITALS — HEIGHT: 60 IN | BODY MASS INDEX: 25.06 KG/M2 | WEIGHT: 127.63 LBS

## 2019-06-14 DIAGNOSIS — M54.5 LOW BACK PAIN, UNSPECIFIED BACK PAIN LATERALITY, UNSPECIFIED CHRONICITY, WITH SCIATICA PRESENCE UNSPECIFIED: ICD-10-CM

## 2019-06-14 DIAGNOSIS — M54.16 LUMBAR RADICULOPATHY: Primary | ICD-10-CM

## 2019-06-14 PROCEDURE — 72120 X-RAY BEND ONLY L-S SPINE: CPT | Mod: TC

## 2019-06-14 PROCEDURE — 99213 OFFICE O/P EST LOW 20 MIN: CPT | Mod: PBBFAC,25 | Performed by: ORTHOPAEDIC SURGERY

## 2019-06-14 PROCEDURE — 72110 X-RAY EXAM L-2 SPINE 4/>VWS: CPT | Mod: 26,,, | Performed by: RADIOLOGY

## 2019-06-14 PROCEDURE — 99213 OFFICE O/P EST LOW 20 MIN: CPT | Mod: S$PBB,,, | Performed by: ORTHOPAEDIC SURGERY

## 2019-06-14 PROCEDURE — 99999 PR PBB SHADOW E&M-EST. PATIENT-LVL III: ICD-10-PCS | Mod: PBBFAC,,, | Performed by: ORTHOPAEDIC SURGERY

## 2019-06-14 PROCEDURE — 99213 PR OFFICE/OUTPT VISIT, EST, LEVL III, 20-29 MIN: ICD-10-PCS | Mod: S$PBB,,, | Performed by: ORTHOPAEDIC SURGERY

## 2019-06-14 PROCEDURE — 72110 XR LUMBAR SPINE AP AND LAT WITH FLEX/EXT: ICD-10-PCS | Mod: 26,,, | Performed by: RADIOLOGY

## 2019-06-14 PROCEDURE — 99999 PR PBB SHADOW E&M-EST. PATIENT-LVL III: CPT | Mod: PBBFAC,,, | Performed by: ORTHOPAEDIC SURGERY

## 2019-06-14 RX ORDER — MELOXICAM 15 MG/1
15 TABLET ORAL DAILY
Qty: 30 TABLET | Refills: 1 | Status: SHIPPED | OUTPATIENT
Start: 2019-06-14

## 2019-06-19 NOTE — PROGRESS NOTES
The patient returns for follow-up.  She has a history of left lower extremity radiculopathy and has had an epidural steroid injection.  This has been going on for about a year.    Her injection was April 17th, since then she has really a lot better.  She feels like she is moving in the right direction.    On physical examination she does have a notable positive left-sided straight leg raise.    Today I reviewed lumbar spine radiographs as well as a lumbar MRI, this demonstrates loss of disc height at L5-S1, and spina bifida occulta at L5.  She has left-sided L5-S1 disc herniation.    Today we discussed options, I feel like her left lower extremity radiculopathy is moving in the right direction.  I have recommended Mobic and physical therapy and I will see her back in 6-8 weeks if she is still having symptoms.    I spent 15 minutes with the patient of which greater than 1/2 the time was devoted to counciling the patient regarding treatment options.

## 2019-06-26 ENCOUNTER — CLINICAL SUPPORT (OUTPATIENT)
Dept: REHABILITATION | Facility: HOSPITAL | Age: 44
End: 2019-06-26
Payer: MEDICAID

## 2019-06-26 DIAGNOSIS — R29.898 WEAKNESS OF BOTH HIPS: ICD-10-CM

## 2019-06-26 DIAGNOSIS — M25.69 BACK STIFFNESS: ICD-10-CM

## 2019-06-26 DIAGNOSIS — R29.898 DECREASED STRENGTH OF TRUNK AND BACK: ICD-10-CM

## 2019-06-26 DIAGNOSIS — R25.9 DYSFUNCTIONAL MOVEMENTS: ICD-10-CM

## 2019-06-26 DIAGNOSIS — M62.89 HAMSTRING TIGHTNESS OF LEFT LOWER EXTREMITY: ICD-10-CM

## 2019-06-26 PROCEDURE — 97161 PT EVAL LOW COMPLEX 20 MIN: CPT | Mod: PO | Performed by: PHYSICAL THERAPIST

## 2019-06-26 NOTE — PROGRESS NOTES
[facility]  Physical Therapy Initial Evaluation    Name: Lea Springer  Clinic Number: [id number]    Therapy Diagnosis:   Encounter Diagnoses   Name Primary?    Back stiffness     Hamstring tightness of left lower extremity     Weakness of both hips     Dysfunctional movements     Decreased strength of trunk and back      Physician: Ynes Olea, KAYE    Physician Orders: PT Eval and Treat back  Medical Diagnosis from Referral:   M54.16 (ICD-10-CM) - Lumbar radiculopathy  Evaluation Date: 6/26/2019  Authorization Period Expiration: 7/13/2019  Plan of Care Expiration: 9/26/2019  Visit # / Visits authorized: 1/ 6    Time In: 1115  Time Out: 1215  Total Billable Time: 60 minutes    Precautions: Standard    Subjective   Medical History:   No past medical history on file.    Surgical History:   Lea Springer  has a past surgical history that includes Epidural steroid injection (Left, 4/17/2019).    Medications:   Lea has a current medication list which includes the following prescription(s): meloxicam and meloxicam.    Allergies:   Review of patient's allergies indicates:  No Known Allergies     History of current condition - Lea reports: pain that is intermittent LLE.   Date of onset: one year ago, gradual onset. The pain appeared in the LLE with no particular posture or movement.      Pain:  Current 1/10, worst 2/10, best 0/10   Location: left posterior thigh  Description: Aching  Aggravating Factors: Sitting  Easing Factors: standiing and walking    Sleep - not disturbed since SERENITY but disturbed before.   Cough / sneeze / strain - denies but before with sneezing  B/B function - negative     Current Level of Function: -  Personal - adjusted the way she puts on shoes and socks otherwise not limited.with dressing or bathing  Domestic - not limited but does not lift anything heavy. Accommodated how she vacuums    Community / social - not limited.   Occupation  - NA   Recreation/fitness  - exercises on her own mainly walking or light jogging.     Prior Level of Function: prior to SERENITY she was significantly limited with domestic, personal activities.  Prior Therapy: no but has undergone SERENITY x1 4/16/2019.   Social History:   lives with their family single family home. Stairs one flight x13   Occupation: not employed     Pts goals: to be able to extend the LLE straight up and not injure the back/leg again.   Imaging, X-Ray- 6/14/2019: Four views: Alignment is normal.  No fracture, dislocation or bone destruction seen.  There is a mild levoconvex curvature of the spine.  No instability seen.  MRI 3/28/2019 - The vertebral bodies demonstrate no evidence of fracture, osseous destructive process or aggressive bone marrow replacement process.  Mild leftward convex curvature lumbar spine.  No significant spondylolisthesis.  Degenerative changes individual disc levels further discussed below:  L1-2, L2-3, L3-4: No significant degenerative disc disease.  No spinal stenosis or neural foraminal narrowing.  L4-5: Minimal disc bulging eccentric to the right.  Mild facet arthropathy.  No spinal stenosis, but there is mild bilateral neural foraminal encroachment.  L5-S1: Mild facet arthropathy.  There is mild disc bulging with superimposed left lateral recess disc protrusion.  This abuts and displaces the descending left S1 nerve root.  The terminal spinal cord, conus, and cauda equina demonstrate normal caliber, morphology, and signal.          Objective     Posture Alignment: left lateral shift vs functional left thoracic lateral curve.   Sensation: Light Touch: Intact  Palpation: pain elicited over L3-4, L4-5 interspaces.       Lumbar/Thoracic AROM: Pain/Dysfunction with Movement:   Flexion                       60/40=20 DN- pulling in post Left thigh, no posterior shift   Extension                    20/10=10 DN - center of the lumbar spine    Right side bending            30    Left side bending                40    Right rotation                      50 FN   Left rotation                         50 FN       LOWER EXTREMITY STRENGTH:   Left 5/5 Right 5/5     Trunk extension fair   Abdominals/Core  Fair-      DTR's:   Left Right   Patella Tendon 2+ 2+   Achilles Tendon 1+ 2+           Selective Functional Movement Assessment:  FN: functional, non-painful  FP: functional, painful  DP: dysfunctional, painful  DN: dysfunctional, non-painful  Multi-Segmental Flexion: see above   Multi-Segmental Extension: see above   Multi-Segmental Rotation:   Right:see above   Left:seeabove    FUNCTIONAL MOVEMENT BREAKOUTS:  Long sitting  = DN  SLR   -Active   R - FN  L -  DN  -Passive  R - FN  L - DN  Knees to chest   Active - FN  Passive - NT    Press up  - DP     Hip extension   Active  R - DN  L - DN  Passive   R - FN  L - FN       FLEXIBILITY  Hamstrings R 80/90    L 50/60  Back extensors moderate       SEGMENTAL MOBILITY: normal   Special Tests   Left Right   SLR Neg   Neg       Lasegue Neg  Neg    John Neg  Neg        GAIT: ambulates with no assistive device with independently.     GAIT DEVIATIONS: displays no deviations       Education provided:   - regarding current back condition     CMS Impairment/Limitation/Restriction for FOTO lumbar spine Survey    Therapist reviewed FOTO scores for Lea Springer on 6/26/2019.   FOTO documents entered into [employer] - see Media section.    Limitation Score: 41%  Category: Mobility    Current : CK = at least 40% but < 60% impaired, limited or restricted  Goal: CJ = at least 20% but < 40% impaired, limited or restricted               Assessment         Lea is a 44 y.o. female referred to outpatient Physical Therapy with a medical diagnosis of Lumbar radiculopathy. Pt presents with signs and symptoms consistent with referring diagnosis. Clinical findings demonstrate decreased spinal mobility in extension, a standing flexion and extension stability and motor control dysfunction and  extension/rotation joint mobility and tissue extensibility dysfunction, posterior chain tissue extensibility dysfunction and hip extension stability and core stability and motor control dysfunction.       Evaluation has determined a decrease in functional status and subjective and objective deficits that can be addressed by physical therapy intervention. Pt demonstrates pain limiting functional activities. Decreased flexibility and strength limiting normal movement patterns. Decreased segmental motion. Decreased postural strength and awareness. Subjective and objective measures are addressed by goals in the plan of care. Patient/family are involved in the development of these goals. Patient/family are educated about current injury and treatment.    .Current impairments limits patient with all functional activities.   Pt prognosis is Good.   Pt will benefit from skilled outpatient Physical Therapy to address the deficits stated above and in the chart below, provide pt/family education, and to maximize pt's level of independence.     Plan of care discussed with patient: Yes  Pt's spiritual, cultural and educational needs considered and patient is agreeable to the plan of care and goals as stated below:       Anticipated Barriers for therapy: none    Medical Necessity is demonstrated by the following  History  Co-morbidities and personal factors that may impact the plan of care Co-morbidities:   None identified    Personal Factors:   no deficits     low   Examination  Body Structures and Functions, activity limitations and participation restrictions that may impact the plan of care Body Regions:   back    Body Systems:    musculoskeletal    Participation Restrictions:   none    Activity limitations:   Learning and applying knowledge  no deficits    General Tasks and Commands  no deficits    Communication  no deficits    Mobility  lifting and carrying objects    Self care  dressing    Domestic Life  no  deficits    Interactions/Relationships  no deficits    Life Areas  no deficits    Community and Social Life  no deficits         low   Clinical Presentation stable and uncomplicated low   Decision Making/ Complexity Score: low             Short Term GOALS: 5 weeks. Pt agrees with goals set.  1. Pt to report no pain associated with sitting    2. Pt to demonstrate spinal stability with core activation during transitional movements.   3. Patient demonstrates independence with HEP.   4. Patient demonstrates independence with Postural Awareness.   5. Patient demonstrates increased LE flexibility in the left hamstrings to functional level of 70 to 80 degrees to improve functional trunk movements       Long Term GOALS: 10 weeks. Pt agrees with goals set.  1. Patient demonstrates increased strength Bilateral gluteals to 3+/5 or greater to improve tolerance to functional activities and restore functional hip movement  2. Patient demonstrates increased trunk strength with improved trunk mobility standing.  3. Patient demonstrates improved functional trunk mobility in standing flexion and extension to improve overall function mobility associated with activity.   4. Patient demonstrates independence with body mechanics.       Plan       Plan of care Certification: 6/26/2019 to 9/26/2019.    Outpatient Physical Therapy 2 times weekly for 10 weeks to include the following interventions: Manual Therapy, Patient Education and Therapeutic Exercise.     Jem Chisholm, FOSTER      Therapist: Jem Chisholm, PT

## 2019-06-27 PROBLEM — R29.898 WEAKNESS OF BOTH HIPS: Status: ACTIVE | Noted: 2019-06-27

## 2019-06-27 PROBLEM — R29.898 DECREASED STRENGTH OF TRUNK AND BACK: Status: ACTIVE | Noted: 2019-06-27

## 2019-06-27 PROBLEM — R25.9 DYSFUNCTIONAL MOVEMENTS: Status: ACTIVE | Noted: 2019-06-27

## 2019-06-27 PROBLEM — M25.69 BACK STIFFNESS: Status: ACTIVE | Noted: 2019-06-27

## 2019-06-27 PROBLEM — M62.89 HAMSTRING TIGHTNESS OF LEFT LOWER EXTREMITY: Status: ACTIVE | Noted: 2019-06-27

## 2019-06-28 ENCOUNTER — CLINICAL SUPPORT (OUTPATIENT)
Dept: REHABILITATION | Facility: HOSPITAL | Age: 44
End: 2019-06-28
Payer: MEDICAID

## 2019-06-28 DIAGNOSIS — R29.898 DECREASED STRENGTH OF TRUNK AND BACK: ICD-10-CM

## 2019-06-28 DIAGNOSIS — R25.9 DYSFUNCTIONAL MOVEMENTS: ICD-10-CM

## 2019-06-28 DIAGNOSIS — M25.69 BACK STIFFNESS: ICD-10-CM

## 2019-06-28 DIAGNOSIS — R29.898 WEAKNESS OF BOTH HIPS: Primary | ICD-10-CM

## 2019-06-28 PROBLEM — M62.89 HAMSTRING TIGHTNESS OF LEFT LOWER EXTREMITY: Status: RESOLVED | Noted: 2019-06-27 | Resolved: 2019-06-28

## 2019-06-28 PROCEDURE — 97110 THERAPEUTIC EXERCISES: CPT | Mod: PO | Performed by: PHYSICAL THERAPIST

## 2019-06-28 NOTE — PLAN OF CARE
[facility]  Physical Therapy Initial Evaluation    Name: Lea Springer  Clinic Number: [id number]    Therapy Diagnosis:   Encounter Diagnoses   Name Primary?    Back stiffness     Hamstring tightness of left lower extremity     Weakness of both hips     Dysfunctional movements     Decreased strength of trunk and back      Physician: Ynes Olea, KAYE    Physician Orders: PT Eval and Treat back  Medical Diagnosis from Referral:   M54.16 (ICD-10-CM) - Lumbar radiculopathy  Evaluation Date: 6/26/2019  Authorization Period Expiration: 7/13/2019  Plan of Care Expiration: 9/26/2019  Visit # / Visits authorized: 1/ 6    Time In: 1115  Time Out: 1215  Total Billable Time: 60 minutes    Precautions: Standard    Subjective   Medical History:   No past medical history on file.    Surgical History:   Lea Springer  has a past surgical history that includes Epidural steroid injection (Left, 4/17/2019).    Medications:   Lea has a current medication list which includes the following prescription(s): meloxicam and meloxicam.    Allergies:   Review of patient's allergies indicates:  No Known Allergies     History of current condition - Lea reports: pain that is intermittent LLE.   Date of onset: one year ago, gradual onset. The pain appeared in the LLE with no particular posture or movement.      Pain:  Current 1/10, worst 2/10, best 0/10   Location: left posterior thigh  Description: Aching  Aggravating Factors: Sitting  Easing Factors: standiing and walking    Sleep - not disturbed since SERENITY but disturbed before.   Cough / sneeze / strain - denies but before with sneezing  B/B function - negative     Current Level of Function: -  Personal - adjusted the way she puts on shoes and socks otherwise not limited.with dressing or bathing  Domestic - not limited but does not lift anything heavy. Accommodated how she vacuums    Community / social - not limited.   Occupation  - NA   Recreation/fitness -  exercises on her own mainly walking or light jogging.     Prior Level of Function: prior to SERENITY she was significantly limited with domestic, personal activities.  Prior Therapy: no but has undergone SERENITY x1 4/16/2019.   Social History:   lives with their family single family home. Stairs one flight x13   Occupation: not employed     Pts goals: to be able to extend the LLE straight up and not injure the back/leg again.   Imaging, X-Ray- 6/14/2019: Four views: Alignment is normal.  No fracture, dislocation or bone destruction seen.  There is a mild levoconvex curvature of the spine.  No instability seen.  MRI 3/28/2019 - The vertebral bodies demonstrate no evidence of fracture, osseous destructive process or aggressive bone marrow replacement process.  Mild leftward convex curvature lumbar spine.  No significant spondylolisthesis.  Degenerative changes individual disc levels further discussed below:  L1-2, L2-3, L3-4: No significant degenerative disc disease.  No spinal stenosis or neural foraminal narrowing.  L4-5: Minimal disc bulging eccentric to the right.  Mild facet arthropathy.  No spinal stenosis, but there is mild bilateral neural foraminal encroachment.  L5-S1: Mild facet arthropathy.  There is mild disc bulging with superimposed left lateral recess disc protrusion.  This abuts and displaces the descending left S1 nerve root.  The terminal spinal cord, conus, and cauda equina demonstrate normal caliber, morphology, and signal.          Objective     Posture Alignment: left lateral shift vs functional left thoracic lateral curve.   Sensation: Light Touch: Intact  Palpation: pain elicited over L3-4, L4-5 interspaces.       Lumbar/Thoracic AROM: Pain/Dysfunction with Movement:   Flexion                       60/40=20 DN- pulling in post Left thigh, no posterior shift   Extension                    20/10=10 DN - center of the lumbar spine    Right side bending            30    Left side bending                40    Right rotation                      50 FN   Left rotation                         50 FN       LOWER EXTREMITY STRENGTH:   Left 5/5 Right 5/5     Trunk extension fair   Abdominals/Core  Fair-      DTR's:   Left Right   Patella Tendon 2+ 2+   Achilles Tendon 1+ 2+           Selective Functional Movement Assessment:  FN: functional, non-painful  FP: functional, painful  DP: dysfunctional, painful  DN: dysfunctional, non-painful  Multi-Segmental Flexion: see above   Multi-Segmental Extension: see above   Multi-Segmental Rotation:   Right:see above   Left:seeabove    FUNCTIONAL MOVEMENT BREAKOUTS:  Long sitting  = DN  SLR   -Active   R - FN  L -  DN  -Passive  R - FN  L - DN  Knees to chest   Active - FN  Passive - NT    Press up  - DP     Hip extension   Active  R - DN  L - DN  Passive   R - FN  L - FN       FLEXIBILITY  Hamstrings R 80/90    L 50/60  Back extensors moderate       SEGMENTAL MOBILITY: normal   Special Tests   Left Right   SLR Neg   Neg       Lasegue Neg  Neg    John Neg  Neg        GAIT: ambulates with no assistive device with independently.     GAIT DEVIATIONS: displays no deviations       Education provided:   - regarding current back condition     CMS Impairment/Limitation/Restriction for FOTO lumbar spine Survey    Therapist reviewed FOTO scores for Lea Springer on 6/26/2019.   FOTO documents entered into [employer] - see Media section.    Limitation Score: 41%  Category: Mobility    Current : CK = at least 40% but < 60% impaired, limited or restricted  Goal: CJ = at least 20% but < 40% impaired, limited or restricted               Assessment         Lea is a 44 y.o. female referred to outpatient Physical Therapy with a medical diagnosis of Lumbar radiculopathy. Pt presents with signs and symptoms consistent with referring diagnosis. Clinical findings demonstrate decreased spinal mobility in extension, a standing flexion and extension stability and motor control dysfunction and  extension/rotation joint mobility and tissue extensibility dysfunction, posterior chain tissue extensibility dysfunction and hip extension stability and core stability and motor control dysfunction.       Evaluation has determined a decrease in functional status and subjective and objective deficits that can be addressed by physical therapy intervention. Pt demonstrates pain limiting functional activities. Decreased flexibility and strength limiting normal movement patterns. Decreased segmental motion. Decreased postural strength and awareness. Subjective and objective measures are addressed by goals in the plan of care. Patient/family are involved in the development of these goals. Patient/family are educated about current injury and treatment.    .Current impairments limits patient with all functional activities.   Pt prognosis is Good.   Pt will benefit from skilled outpatient Physical Therapy to address the deficits stated above and in the chart below, provide pt/family education, and to maximize pt's level of independence.     Plan of care discussed with patient: Yes  Pt's spiritual, cultural and educational needs considered and patient is agreeable to the plan of care and goals as stated below:       Anticipated Barriers for therapy: none    Medical Necessity is demonstrated by the following  History  Co-morbidities and personal factors that may impact the plan of care Co-morbidities:   None identified    Personal Factors:   no deficits     low   Examination  Body Structures and Functions, activity limitations and participation restrictions that may impact the plan of care Body Regions:   back    Body Systems:    musculoskeletal    Participation Restrictions:   none    Activity limitations:   Learning and applying knowledge  no deficits    General Tasks and Commands  no deficits    Communication  no deficits    Mobility  lifting and carrying objects    Self care  dressing    Domestic Life  no  deficits    Interactions/Relationships  no deficits    Life Areas  no deficits    Community and Social Life  no deficits         low   Clinical Presentation stable and uncomplicated low   Decision Making/ Complexity Score: low             Short Term GOALS: 5 weeks. Pt agrees with goals set.  1. Pt to report no pain associated with sitting    2. Pt to demonstrate spinal stability with core activation during transitional movements.   3. Patient demonstrates independence with HEP.   4. Patient demonstrates independence with Postural Awareness.   5. Patient demonstrates increased LE flexibility in the left hamstrings to functional level of 70 to 80 degrees to improve functional trunk movements       Long Term GOALS: 10 weeks. Pt agrees with goals set.  1. Patient demonstrates increased strength Bilateral gluteals to 3+/5 or greater to improve tolerance to functional activities and restore functional hip movement  2. Patient demonstrates increased trunk strength with improved trunk mobility standing.  3. Patient demonstrates improved functional trunk mobility in standing flexion and extension to improve overall function mobility associated with activity.   4. Patient demonstrates independence with body mechanics.       Plan       Plan of care Certification: 6/26/2019 to 9/26/2019.    Outpatient Physical Therapy 2 times weekly for 10 weeks to include the following interventions: Manual Therapy, Patient Education and Therapeutic Exercise.     Jem Chisholm, PT

## 2019-06-28 NOTE — PROGRESS NOTES
"              Physical Therapy Daily Treatment Note     Name: Lea Springer  Clinic Number: [id number]    Therapy Diagnosis:   Encounter Diagnoses   Name Primary?    Weakness of both hips Yes    Dysfunctional movements     Decreased strength of trunk and back     Back stiffness      Physician: Raphael Rosales MD    Visit Date: 6/28/2019     Physician Orders: PT Eval and Treat back  Medical Diagnosis from Referral:   M54.16 (ICD-10-CM) - Lumbar radiculopathy  Evaluation Date: 6/26/2019  Authorization Period Expiration: 7/13/2019  Plan of Care Expiration: 9/26/2019  Visit # / Visits authorized: 2/ 6    Time In: 1015  Time Out: 1115  Total Billable Time: 60 minutes    Precautions: Standard    Subjective     Pt reports: the back is feeling good and not pain. Tried to do some stretching at home and walking   She was not issued a home exercise program.  Response to previous treatment: good   Functional change: no change    Pain: 0/10  Location: low back      Objective       FUNCTIONAL MOVEMENT BREAKOUTS:  Lumbar exten / rot  Active  R- FN  L - DN  Passive  R - FN  L - DN    Lumbar locked rotation   Active  R - FN  L - DN  Passive  R - FN  L - FN      Lea received therapeutic exercises to develop strength, ROM, flexibility and core stabilization for 60 minutes including:        Leg press   Recumbent bike  Upright bike   UE ergometer  Treadmill   Elliptical       THERAPEUTIC EXERCISE  SUPINE  -HS stretches 20" x 6   -LTR x15  -scissors at 90/60/45 x12  -Toy soldier x15  -bridging x20 narrow base and wide base.   -lumbar lock bridge. x15  -bridge marching x20    SIDELYING  -open book x15      PRONE  -sust exten   -Quad lumbar locked rotation   -leg lifts x15  -swimmers x15    STANDING.  -wall extension x15    SITTING    MANUAL THERAPY: IASTM left hamstring using up and down regulation stroking. 1-1 PT = 5 min.   MODALITY  DIRECT EDUCATION:         Assessment     The patient performed the activities without limitation. " She demonstrated a unilateral extension / rotation joint mobility and or tissue extensibility dysfunction.  Response to IASTM resulted in increased hamstring flexibility LLE and improved SLR.   Lea is progressing well towards her goals.   Pt prognosis is Good.     Pt will continue to benefit from skilled outpatient physical therapy to address the deficits listed in the problem list box on initial evaluation, provide pt/family education and to maximize pt's level of independence in the home and community environment.     Pt's spiritual, cultural and educational needs considered and pt agreeable to plan of care and goals.     Anticipated barriers to physical therapy: none       Short Term GOALS: 5 weeks. Pt agrees with goals set.  1. Pt to report no pain associated with sitting    2. Pt to demonstrate spinal stability with core activation during transitional movements.   3. Patient demonstrates independence with HEP.   4. Patient demonstrates independence with Postural Awareness.   5. Patient demonstrates increased LE flexibility in the left hamstrings to functional level of 70 to 80 degrees to improve functional trunk movements       Long Term GOALS: 10 weeks. Pt agrees with goals set.  1. Patient demonstrates increased strength Bilateral gluteals to 3+/5 or greater to improve tolerance to functional activities and restore functional hip movement  2. Patient demonstrates increased trunk strength with improved trunk mobility standing.  3. Patient demonstrates improved functional trunk mobility in standing flexion and extension to improve overall function mobility associated with activity.   4. Patient demonstrates independence with body mechanics.       Plan       Plan of care Certification: 6/26/2019 to 9/26/2019.    Outpatient Physical Therapy 2 times weekly for 10 weeks to include the following interventions: Manual Therapy, Patient Education and Therapeutic Exercise.     Jem Chisholm, PT

## 2019-07-05 ENCOUNTER — CLINICAL SUPPORT (OUTPATIENT)
Dept: REHABILITATION | Facility: HOSPITAL | Age: 44
End: 2019-07-05
Payer: MEDICAID

## 2019-07-05 DIAGNOSIS — R25.9 DYSFUNCTIONAL MOVEMENTS: ICD-10-CM

## 2019-07-05 DIAGNOSIS — R29.898 DECREASED STRENGTH OF TRUNK AND BACK: ICD-10-CM

## 2019-07-05 DIAGNOSIS — R29.898 WEAKNESS OF BOTH HIPS: ICD-10-CM

## 2019-07-05 PROCEDURE — 97110 THERAPEUTIC EXERCISES: CPT | Mod: PO

## 2019-07-05 NOTE — PROGRESS NOTES
"              Physical Therapy Daily Treatment Note     Name: Lea Springer  Clinic Number: [id number]    Therapy Diagnosis:   Encounter Diagnoses   Name Primary?    Weakness of both hips     Dysfunctional movements     Decreased strength of trunk and back      Physician: Raphael Rosales MD    Visit Date: 7/5/2019     Physician Orders: PT Eval and Treat back  Medical Diagnosis from Referral:   M54.16 (ICD-10-CM) - Lumbar radiculopathy  Evaluation Date: 6/26/2019  Authorization Period Expiration: 7/13/2019  Plan of Care Expiration: 9/26/2019  Visit # / Visits authorized: 3/ 6    Time In: 1005   Time Out: 10:53 AM  Total Billable Time: 30 minutes    Precautions: Standard    Subjective     Pt reports: mild pain in the back of the L leg. Pt reports no pain in the back   She was not issued a home exercise program.  Response to previous treatment: good   Functional change: no change    Pain: 2/10  Location: L posterior thigh     Objective       Lea received therapeutic exercises to develop strength, ROM, flexibility and core stabilization for 45 minutes including:    Leg press   Recumbent bike  Upright bike   UE ergometer  Treadmill   Elliptical       THERAPEUTIC EXERCISE  SUPINE  -HS stretches 20" x 6   -LTR x15  -scissors at 90/60/45 x12 - NP  -Toy soldier x15 - NP  -bridging x20 narrow base and wide base.   -lumbar lock bridge. x15  -bridge marching x20  DKTC + pulldown with pink sports cord x20     SIDELYING  -open book x15      PRONE  -sust exten   -Quad lumbar locked rotation   -leg lifts x15  -swimmers x15  Bird dog 2x8     STANDING.  -wall extension x15    SITTING    MANUAL THERAPY: theraroller left hamstring 1-1 PT = 5 min.   MODALITY  DIRECT EDUCATION:     Assessment     Patient presented with mild L posterior thigh pain today. Pt tolerated all exercises well today with no onset of adverse effects or back pain. Will cont to progress per patient's tolerance.   Lea is progressing well towards her goals. "   Pt prognosis is Good.     Pt will continue to benefit from skilled outpatient physical therapy to address the deficits listed in the problem list box on initial evaluation, provide pt/family education and to maximize pt's level of independence in the home and community environment.     Pt's spiritual, cultural and educational needs considered and pt agreeable to plan of care and goals.     Anticipated barriers to physical therapy: none       Short Term GOALS: 5 weeks. Pt agrees with goals set.  1. Pt to report no pain associated with sitting    2. Pt to demonstrate spinal stability with core activation during transitional movements.   3. Patient demonstrates independence with HEP.   4. Patient demonstrates independence with Postural Awareness.   5. Patient demonstrates increased LE flexibility in the left hamstrings to functional level of 70 to 80 degrees to improve functional trunk movements       Long Term GOALS: 10 weeks. Pt agrees with goals set.  1. Patient demonstrates increased strength Bilateral gluteals to 3+/5 or greater to improve tolerance to functional activities and restore functional hip movement  2. Patient demonstrates increased trunk strength with improved trunk mobility standing.  3. Patient demonstrates improved functional trunk mobility in standing flexion and extension to improve overall function mobility associated with activity.   4. Patient demonstrates independence with body mechanics.       Plan       Plan of care Certification: 6/26/2019 to 9/26/2019.    Outpatient Physical Therapy 2 times weekly for 10 weeks to include the following interventions: Manual Therapy, Patient Education and Therapeutic Exercise.     Brittnee Weller PTA

## 2019-07-15 ENCOUNTER — CLINICAL SUPPORT (OUTPATIENT)
Dept: REHABILITATION | Facility: HOSPITAL | Age: 44
End: 2019-07-15
Payer: MEDICAID

## 2019-07-15 DIAGNOSIS — R29.898 DECREASED STRENGTH OF TRUNK AND BACK: ICD-10-CM

## 2019-07-15 DIAGNOSIS — R29.898 WEAKNESS OF BOTH HIPS: ICD-10-CM

## 2019-07-15 DIAGNOSIS — R25.9 DYSFUNCTIONAL MOVEMENTS: ICD-10-CM

## 2019-07-15 PROCEDURE — 97110 THERAPEUTIC EXERCISES: CPT | Mod: PO

## 2019-07-15 NOTE — PROGRESS NOTES
"              Physical Therapy Daily Treatment Note     Name: Lea Springer  Clinic Number: [id number]    Therapy Diagnosis:   Encounter Diagnoses   Name Primary?    Weakness of both hips     Dysfunctional movements     Decreased strength of trunk and back      Physician: Raphael Rosales MD    Visit Date: 7/15/2019     Physician Orders: PT Eval and Treat back  Medical Diagnosis from Referral:   M54.16 (ICD-10-CM) - Lumbar radiculopathy  Evaluation Date: 6/26/2019  Authorization Period Expiration: 7/13/2019  Plan of Care Expiration: 9/26/2019  Visit # / Visits authorized: 4/ 6    Time In: 1:00 PM  Time Out: 1:55 PM  Total Billable Time: 15 minutes    Precautions: Standard    Subjective     Pt reports: the back of her leg hurts. Patient reports she took herself off of her medication.   She was not issued a home exercise program.  Response to previous treatment: good   Functional change: no change    Pain: 3/10  Location: L posterior thigh     Objective       Lea received therapeutic exercises to develop strength, ROM, flexibility and core stabilization for 50 minutes including:    Leg press   Recumbent bike  Upright bike   UE ergometer  Treadmill   Elliptical       THERAPEUTIC EXERCISE  SUPINE  -HS stretches 3x30"   -LTR x 20   -scissors at 90/60 x12   -Toy soldier x15   -bridging x20 narrow base and wide base.   -lumbar lock bridge. x15  -bridge marching x20  DKTC + pulldown with orange sports cord x20     SIDELYING  -open book x20      PRONE  -sust exten   -Quad lumbar locked rotation   -leg lifts x15  -swimmers x15  Bird dog 2x10    STANDING.  -wall extension x15  -paloff press 2x10 OTB     SITTING    MANUAL THERAPY: theraroller left hamstring 1-1 PT = 5 min.   MODALITY  DIRECT EDUCATION:     Assessment     Patient continues to report L posterior thigh pain. Pt able to complete all exercises performed today with no onset of adverse effects. Post treatment patient reports mild decrease in pain. Will cont to " progress per patient's tolerance.   Lea is progressing well towards her goals.   Pt prognosis is Good.     Pt will continue to benefit from skilled outpatient physical therapy to address the deficits listed in the problem list box on initial evaluation, provide pt/family education and to maximize pt's level of independence in the home and community environment.     Pt's spiritual, cultural and educational needs considered and pt agreeable to plan of care and goals.     Anticipated barriers to physical therapy: none       Short Term GOALS: 5 weeks. Pt agrees with goals set.  1. Pt to report no pain associated with sitting    2. Pt to demonstrate spinal stability with core activation during transitional movements.   3. Patient demonstrates independence with HEP.   4. Patient demonstrates independence with Postural Awareness.   5. Patient demonstrates increased LE flexibility in the left hamstrings to functional level of 70 to 80 degrees to improve functional trunk movements       Long Term GOALS: 10 weeks. Pt agrees with goals set.  1. Patient demonstrates increased strength Bilateral gluteals to 3+/5 or greater to improve tolerance to functional activities and restore functional hip movement  2. Patient demonstrates increased trunk strength with improved trunk mobility standing.  3. Patient demonstrates improved functional trunk mobility in standing flexion and extension to improve overall function mobility associated with activity.   4. Patient demonstrates independence with body mechanics.       Plan       Plan of care Certification: 6/26/2019 to 9/26/2019.    Outpatient Physical Therapy 2 times weekly for 10 weeks to include the following interventions: Manual Therapy, Patient Education and Therapeutic Exercise.     Brittnee Weller, PTA

## 2019-07-18 ENCOUNTER — CLINICAL SUPPORT (OUTPATIENT)
Dept: REHABILITATION | Facility: HOSPITAL | Age: 44
End: 2019-07-18
Payer: MEDICAID

## 2019-07-18 DIAGNOSIS — R29.898 WEAKNESS OF BOTH HIPS: ICD-10-CM

## 2019-07-18 DIAGNOSIS — R25.9 DYSFUNCTIONAL MOVEMENTS: ICD-10-CM

## 2019-07-18 DIAGNOSIS — M25.69 BACK STIFFNESS: Primary | ICD-10-CM

## 2019-07-18 DIAGNOSIS — R29.898 DECREASED STRENGTH OF TRUNK AND BACK: ICD-10-CM

## 2019-07-18 PROCEDURE — 97140 MANUAL THERAPY 1/> REGIONS: CPT | Mod: PO | Performed by: PHYSICAL THERAPIST

## 2019-07-18 PROCEDURE — 97110 THERAPEUTIC EXERCISES: CPT | Mod: PO | Performed by: PHYSICAL THERAPIST

## 2019-07-18 NOTE — PROGRESS NOTES
"              Physical Therapy Daily Treatment Note     Name: Lea Springer  Clinic Number: [id number]    Therapy Diagnosis:   Encounter Diagnoses   Name Primary?    Back stiffness Yes    Weakness of both hips     Dysfunctional movements     Decreased strength of trunk and back      Physician: Raphael Rosales MD    Visit Date: 7/18/2019     Physician Orders: PT Eval and Treat back  Medical Diagnosis from Referral:   M54.16 (ICD-10-CM) - Lumbar radiculopathy  Evaluation Date: 6/26/2019  Authorization Period Expiration: 7/13/2019  Plan of Care Expiration: 9/26/2019  Visit # / Visits authorized: 5/ 6    Time In: 1510 PM  Time Out: 1630  PM  Total Billable Time: 80  minutes    Precautions: Standard    Subjective     Pt reports: there is still some pain in the back. She says she feels the pain everyday. The pain is there when I move a certain way or when I sneeze. Sitting no pain, laying down no pain and sleep not interrupted. Movements can produce the pain but it goes away.   She was not issued a home exercise program.  Response to previous treatment: good   Functional change: no change    Pain:  2-3 /10  Location: L posterior thigh     Objective       Lea received therapeutic exercises to develop strength, ROM, flexibility and core stabilization for 70 minutes including:    Leg press   Recumbent bike  Upright bike   UE ergometer  Treadmill   Elliptical       THERAPEUTIC EXERCISE  SUPINE  -HS stretches 6" x10  -HS stretches = knee to chest 6" x 10   -LTR x20 c/opp arm raise   -scissors at 90/60 x12   -Toy soldier x15   -bridging x20 narrow base and wide base.   -lumbar lock bridge. x15  -bridge marching x10  DKTC + pulldown with orange sports cord x20     SIDELYING  -open book x20      PRONE  -sust exten   -Quad lumbar locked rotation x12  -leg lifts x15  -swimmers x15  Bird dog 2x10    STANDING.  -wall extension x15  -paloff press 2x10 OTB     SITTING    MANUAL THERAPY: Right SL for manual lumbar mobs utilizing " grade II / III / IIII oscillations. KHOI of the popliteus on the LLE. 1-1 PT = 10 min   MODALITY  DIRECT EDUCATION:         Assessment     Patient performed the activities without limitation except for initial compromised bridging due to posterior left knee pain. KHOI / deep pressure over the posterior knee at the popliteus alleviated the pain and she performed the exercise without  pain. She responded well to manual mobs indicating a relief of pain and feeling better. At the end of the session she responded that the pain in the posterior thigh remained present and she says it is always there. She provides inaccuracy when it comes to the behavior of the thigh pain.      Pt prognosis is Good.     Pt will continue to benefit from skilled outpatient physical therapy to address the deficits listed in the problem list box on initial evaluation, provide pt/family education and to maximize pt's level of independence in the home and community environment.     Pt's spiritual, cultural and educational needs considered and pt agreeable to plan of care and goals.     Anticipated barriers to physical therapy: none       Short Term GOALS: 5 weeks. Pt agrees with goals set.  1. Pt to report no pain associated with sitting    2. Pt to demonstrate spinal stability with core activation during transitional movements.   3. Patient demonstrates independence with HEP.   4. Patient demonstrates independence with Postural Awareness.   5. Patient demonstrates increased LE flexibility in the left hamstrings to functional level of 70 to 80 degrees to improve functional trunk movements       Long Term GOALS: 10 weeks. Pt agrees with goals set.  1. Patient demonstrates increased strength Bilateral gluteals to 3+/5 or greater to improve tolerance to functional activities and restore functional hip movement  2. Patient demonstrates increased trunk strength with improved trunk mobility standing.  3. Patient demonstrates improved functional trunk  mobility in standing flexion and extension to improve overall function mobility associated with activity.   4. Patient demonstrates independence with body mechanics.       Plan       Plan of care Certification: 6/26/2019 to 9/26/2019.    Outpatient Physical Therapy 2 times weekly for 10 weeks to include the following interventions: Manual Therapy, Patient Education and Therapeutic Exercise.     Jem Chisholm, PT

## 2019-07-23 ENCOUNTER — CLINICAL SUPPORT (OUTPATIENT)
Dept: REHABILITATION | Facility: HOSPITAL | Age: 44
End: 2019-07-23
Payer: MEDICAID

## 2019-07-23 DIAGNOSIS — M25.69 BACK STIFFNESS: Primary | ICD-10-CM

## 2019-07-23 DIAGNOSIS — R29.898 DECREASED STRENGTH OF TRUNK AND BACK: ICD-10-CM

## 2019-07-23 DIAGNOSIS — R29.898 WEAKNESS OF BOTH HIPS: ICD-10-CM

## 2019-07-23 DIAGNOSIS — R25.9 DYSFUNCTIONAL MOVEMENTS: ICD-10-CM

## 2019-07-23 PROCEDURE — 97110 THERAPEUTIC EXERCISES: CPT | Mod: PO | Performed by: PHYSICAL THERAPIST

## 2019-07-23 NOTE — PROGRESS NOTES
"              Physical Therapy Daily Treatment Note     Name: Lea Springer  Clinic Number: [id number]    Therapy Diagnosis:   Encounter Diagnoses   Name Primary?    Back stiffness Yes    Weakness of both hips     Dysfunctional movements     Decreased strength of trunk and back      Physician: Raphael Rosales MD    Visit Date: 7/23/2019     Physician Orders: PT Eval and Treat back  Medical Diagnosis from Referral:   M54.16 (ICD-10-CM) - Lumbar radiculopathy  Evaluation Date: 6/26/2019  Authorization Period Expiration: 7/13/2019  Plan of Care Expiration: 9/26/2019  Visit # / Visits authorized:6/ 6    Time In: 1415 PM  Time Out: 1520  PM  Total Billable Time: 65  minutes    Precautions: Standard    Subjective     Pt reports: there is pain that comes and goes in the back of the thigh. It occurs with certain movements.      She was not issued a home exercise program.  Response to previous treatment: good   Functional change: no change    Pain:  4 /10  Location: L posterior thigh     Objective       Lea received therapeutic exercises to develop strength, ROM, flexibility and core stabilization for 65 minutes including:    Leg press 7'  Recumbent bike  Upright bike   UE ergometer  Treadmill   Elliptical       THERAPEUTIC EXERCISE  SUPINE  -HS stretches 6" x10  -HS stretches = knee to chest 6" x 10   -LTR x20 c/opp arm raise   -scissors at 90/60 x12   -Toy soldier x15   -bridging x20 narrow base and wide base.   -lumbar lock bridge. x15  -bridge marching x10  DKTC + pulldown with orange sports cord x20     SIDELYING  -open book x20    PRONE  -sust exten   -Quad lumbar locked rotation x12  -leg lifts x15  -swimmers x15  Bird dog 2x10    STANDING.  -wall extension x15  -paloff press 2x10 OTB     SITTING    MANUAL THERAPY: IASTM over the left hamstring using up and down regulation stroking with localized scraping. 5 min. Muljordy MWM for HS lengthening.  MODALITY  DIRECT EDUCATION:         Assessment     Completed the " routine as noted. She responded well to manual intervention. Noted myofascial restrictions in the left hamstring. She also related to pain  In the lateral hamstring associated with bridging that appears to be muscular and not radicular in nature. She also appeared to respond to Mulligan MM for hamstring lengthening .     Pt prognosis is Good.     Pt will continue to benefit from skilled outpatient physical therapy to address the deficits listed in the problem list box on initial evaluation, provide pt/family education and to maximize pt's level of independence in the home and community environment.     Pt's spiritual, cultural and educational needs considered and pt agreeable to plan of care and goals.     Anticipated barriers to physical therapy: none       Short Term GOALS: 5 weeks. Pt agrees with goals set.  1. Pt to report no pain associated with sitting    2. Pt to demonstrate spinal stability with core activation during transitional movements.   3. Patient demonstrates independence with HEP.   4. Patient demonstrates independence with Postural Awareness.   5. Patient demonstrates increased LE flexibility in the left hamstrings to functional level of 70 to 80 degrees to improve functional trunk movements       Long Term GOALS: 10 weeks. Pt agrees with goals set.  1. Patient demonstrates increased strength Bilateral gluteals to 3+/5 or greater to improve tolerance to functional activities and restore functional hip movement  2. Patient demonstrates increased trunk strength with improved trunk mobility standing.  3. Patient demonstrates improved functional trunk mobility in standing flexion and extension to improve overall function mobility associated with activity.   4. Patient demonstrates independence with body mechanics.       Plan       Plan of care Certification: 6/26/2019 to 9/26/2019.    Outpatient Physical Therapy 2 times weekly for 10 weeks to include the following interventions: Manual Therapy, Patient  Education and Therapeutic Exercise.     Jem Chisholm, PT

## 2019-07-25 ENCOUNTER — CLINICAL SUPPORT (OUTPATIENT)
Dept: REHABILITATION | Facility: HOSPITAL | Age: 44
End: 2019-07-25
Payer: MEDICAID

## 2019-07-25 DIAGNOSIS — R25.9 DYSFUNCTIONAL MOVEMENTS: ICD-10-CM

## 2019-07-25 DIAGNOSIS — R29.898 DECREASED STRENGTH OF TRUNK AND BACK: Primary | ICD-10-CM

## 2019-07-25 DIAGNOSIS — M54.16 LUMBAR RADICULOPATHY: ICD-10-CM

## 2019-07-25 DIAGNOSIS — R29.898 WEAKNESS OF BOTH HIPS: ICD-10-CM

## 2019-07-25 PROCEDURE — 97110 THERAPEUTIC EXERCISES: CPT | Mod: PO | Performed by: PHYSICAL THERAPIST

## 2019-07-25 NOTE — PROGRESS NOTES
"              Physical Therapy Daily Treatment Note     Name: Lea Springer  Clinic Number: [id number]    Therapy Diagnosis:   Encounter Diagnoses   Name Primary?    Decreased strength of trunk and back Yes    Dysfunctional movements     Weakness of both hips     Lumbar radiculopathy      Physician: Raphael Rosales MD    Visit Date: 7/25/2019     Physician Orders: PT Eval and Treat back  Medical Diagnosis from Referral:   M54.16 (ICD-10-CM) - Lumbar radiculopathy  Evaluation Date: 6/26/2019  Authorization Period Expiration: 7/13/2019  Plan of Care Expiration: 9/26/2019  Visit # / Visits authorized:6/ 6  NEW: 1/    Time In: 1115 PM  Time Out: 1215  PM  Total Billable Time: 60-  minutes    Precautions: Standard    Subjective     Pt reports: there is still that pain in the back of the thigh. It is there now but not too bad. Says she is trying to do a lot of stretching at home.     She was not issued a home exercise program.  Response to previous treatment: good   Functional change: no change    Pain:  2 /10  Location: L posterior thigh     Objective       Lea received therapeutic exercises to develop strength, ROM, flexibility and core stabilization for 55 minutes including:    Leg press 7'  Recumbent bike  Upright bike   UE ergometer  Treadmill   Elliptical       THERAPEUTIC EXERCISE  SUPINE  Dynamic  +knee to chest opp arm  +toy soldier   +scorpion   +DKC arms OH   +LTR x20 c/opp arm raise    -HS stretches 6" x10  -HS stretches = knee to chest 6" x 10      -scissors at 90/60/45 x12      -bridging x20 narrow base and wide base.   -lumbar lock bridge. x15  -bridge marching x10  DKTC + pulldown with orange sports cord x20     SIDELYING  -open book x20    PRONE  -sust exten   -Quad lumbar locked rotation x12  -Quad leg lifts x15  -swimmers x15  -Quad Bird dog 2x10    STANDING.  -wall extension x15  -paloff press 2x10 OTB     SITTING    MANUAL THERAPY: IASTM over the left hamstring using up and down regulation " stroking with localized scraping. 5 min. 1-1 PT  MODALITY  DIRECT EDUCATION:         Assessment     Patient performed all activities as noted. She encountered some pain during IASTM over the proximal end of the posterior thigh on the left with identification of myofascial nodules in the tissue. Performed all exercises w/o limitation from pain. She did not report pain at the end of the session.      Pt prognosis is Good.     Pt will continue to benefit from skilled outpatient physical therapy to address the deficits listed in the problem list box on initial evaluation, provide pt/family education and to maximize pt's level of independence in the home and community environment.     Pt's spiritual, cultural and educational needs considered and pt agreeable to plan of care and goals.     Anticipated barriers to physical therapy: none       Short Term GOALS: 5 weeks. Pt agrees with goals set.  1. Pt to report no pain associated with sitting    2. Pt to demonstrate spinal stability with core activation during transitional movements.   3. Patient demonstrates independence with HEP.   4. Patient demonstrates independence with Postural Awareness.   5. Patient demonstrates increased LE flexibility in the left hamstrings to functional level of 70 to 80 degrees to improve functional trunk movements       Long Term GOALS: 10 weeks. Pt agrees with goals set.  1. Patient demonstrates increased strength Bilateral gluteals to 3+/5 or greater to improve tolerance to functional activities and restore functional hip movement  2. Patient demonstrates increased trunk strength with improved trunk mobility standing.  3. Patient demonstrates improved functional trunk mobility in standing flexion and extension to improve overall function mobility associated with activity.   4. Patient demonstrates independence with body mechanics.       Plan       Plan of care Certification: 6/26/2019 to 9/26/2019.    Outpatient Physical Therapy 2 times  weekly for 10 weeks to include the following interventions: Manual Therapy, Patient Education and Therapeutic Exercise.     Jem Chisholm, PT

## 2019-07-30 ENCOUNTER — CLINICAL SUPPORT (OUTPATIENT)
Dept: REHABILITATION | Facility: HOSPITAL | Age: 44
End: 2019-07-30
Payer: MEDICAID

## 2019-07-30 DIAGNOSIS — R25.9 DYSFUNCTIONAL MOVEMENTS: ICD-10-CM

## 2019-07-30 DIAGNOSIS — R29.898 WEAKNESS OF BOTH HIPS: ICD-10-CM

## 2019-07-30 DIAGNOSIS — R29.898 DECREASED STRENGTH OF TRUNK AND BACK: ICD-10-CM

## 2019-07-30 PROCEDURE — 97110 THERAPEUTIC EXERCISES: CPT | Mod: PO | Performed by: PHYSICAL THERAPIST

## 2019-07-30 NOTE — PROGRESS NOTES
Physical Therapy Daily Treatment Note     Name: Lea Springer  Clinic Number: [id number]    Therapy Diagnosis:   Encounter Diagnoses   Name Primary?    Weakness of both hips     Dysfunctional movements     Decreased strength of trunk and back      Physician: Raphael Rosales MD    Visit Date: 7/30/2019     Physician Orders: PT Eval and Treat back  Medical Diagnosis from Referral:   M54.16 (ICD-10-CM) - Lumbar radiculopathy  Evaluation Date: 6/26/2019  Authorization Period Expiration: 7/13/2019  Plan of Care Expiration: 9/26/2019  Visit # / Visits authorized:6/ 6  NEW: 2/    Time In: 1115 PM  Time Out: 1215  PM  Total Billable Time: 60  minutes    Precautions: Standard    Subjective     Pt reports: she has pain down the back of the left thigh. She says the pain does not go away. The pain is intermittent. It usually occurs when I make a certain movement.   She was not issued a home exercise program.  Response to previous treatment: sore    Functional change: functionally she accommodates the pain with tying shoes and putting on socks.     Pain:  3-4 /10  Location: L posterior thigh     Objective       Selective Functional Movement Assessment:  FN: functional, non-painful  FP: functional, painful  DP: dysfunctional, painful  DN: dysfunctional, non-painful  Multi-Segmental Flexion: DN not able to touch toes. No back pain / HS pulling   Multi-Segmental Extension:DN   Multi-Segmental Rotation:   Right: FN  Left:    FN          FUNCTIONAL MOVEMENT BREAKOUTS:  Long sitting  = DN cant touch  Toes   SLR   -Active   R - FN  L -  DN  -Passive  R - FN  L - DN  Knees to chest   Active - FN  Passive - NT     Press up  - DP      Lumbar extension / rotation   -Active  R - FN  L - FN   -Passive   R - NT   L - NT    Lumbar Locked Rotation    -Active  R - FN  L - FN  -Passive  R- NT  L - NT    Hip extension   Active  R - FN  L - FN   Passive   R - FN  L - FN        FLEXIBILITY  Hamstrings R 80/90    L 60/70 maintains  "pain in the posterior thigh.       Lea received therapeutic exercises to develop strength, ROM, flexibility and core stabilization for 30 minutes including:    Leg press 7'  Recumbent bike  Upright bike   UE ergometer  Treadmill   Elliptical       THERAPEUTIC EXERCISE  SUPINE  Dynamic  +knee to chest opp arm  +toy soldier   +scorpion   +DKC arms OH   +LTR x20 c/opp arm raise    -HS stretches 6" x10  -HS stretches = knee to chest 6" x 10      -scissors at 90/60/45 x12      -bridging x20 narrow base and wide base.   -lumbar lock bridge. x15  -bridge marching x10  DKTC + pulldown with orange sports cord x20     SIDELYING  -open book x20    PRONE  -sust exten   -Quad lumbar locked rotation x15  -Quad leg lifts x15  -swimmers x15  -Quad Bird dog 2x10    STANDING.  -wall extension x15  -paloff press 2x10 OTB     SITTING  -HS stretches 2' in modified long sitting      MANUAL THERAPY: C/R of the left hamstrings performed. SLR achieved passively to 75 degrees.   MODALITY  DIRECT EDUCATION:         Assessment     The patient demonstrates unilateral posterior thigh CLAUDIA. She is spinal extension JMD / CLAUDIA.  Performs all activities without limitation except for left HS stretching.  Will focus on HS as isolated source of pain vs low back related to disc irritation. The pain occurs when the HS is at a stretched position with functional movements.         Pt prognosis is Good.     Pt will continue to benefit from skilled outpatient physical therapy to address the deficits listed in the problem list box on initial evaluation, provide pt/family education and to maximize pt's level of independence in the home and community environment.     Pt's spiritual, cultural and educational needs considered and pt agreeable to plan of care and goals.     Anticipated barriers to physical therapy: none       Short Term GOALS: 5 weeks. Pt agrees with goals set.  1. Pt to report no pain associated with sitting    2. Pt to demonstrate spinal " stability with core activation during transitional movements.   3. Patient demonstrates independence with HEP.   4. Patient demonstrates independence with Postural Awareness.   5. Patient demonstrates increased LE flexibility in the left hamstrings to functional level of 70 to 80 degrees to improve functional trunk movements       Long Term GOALS: 10 weeks. Pt agrees with goals set.  1. Patient demonstrates increased strength Bilateral gluteals to 3+/5 or greater to improve tolerance to functional activities and restore functional hip movement  2. Patient demonstrates increased trunk strength with improved trunk mobility standing.  3. Patient demonstrates improved functional trunk mobility in standing flexion and extension to improve overall function mobility associated with activity.   4. Patient demonstrates independence with body mechanics.       Plan       Plan of care Certification: 6/26/2019 to 9/26/2019.    Outpatient Physical Therapy 2 times weekly for 10 weeks to include the following interventions: Manual Therapy, Patient Education and Therapeutic Exercise.     Jem Chisholm, PT

## 2019-08-01 ENCOUNTER — CLINICAL SUPPORT (OUTPATIENT)
Dept: REHABILITATION | Facility: HOSPITAL | Age: 44
End: 2019-08-01
Payer: MEDICAID

## 2019-08-01 DIAGNOSIS — R25.9 DYSFUNCTIONAL MOVEMENTS: ICD-10-CM

## 2019-08-01 DIAGNOSIS — R29.898 WEAKNESS OF BOTH HIPS: ICD-10-CM

## 2019-08-01 DIAGNOSIS — R29.898 DECREASED STRENGTH OF TRUNK AND BACK: ICD-10-CM

## 2019-08-01 PROCEDURE — 97110 THERAPEUTIC EXERCISES: CPT | Mod: PO | Performed by: PHYSICAL THERAPIST

## 2019-08-01 NOTE — PROGRESS NOTES
Physical Therapy Daily Treatment Note     Name: Lea Springer  Clinic Number: [id number]    Therapy Diagnosis:   Encounter Diagnoses   Name Primary?    Weakness of both hips     Dysfunctional movements     Decreased strength of trunk and back      Physician: Raphael Rosales MD    Visit Date: 8/1/2019     Physician Orders: PT Eval and Treat back  Medical Diagnosis from Referral:   M54.16 (ICD-10-CM) - Lumbar radiculopathy  Evaluation Date: 6/26/2019  Authorization Period Expiration: 7/13/2019  Plan of Care Expiration: 9/26/2019  Visit # / Visits authorized:6/ 6  NEW: 3/    Time In: 1120 PM  Time Out: 77296 PM  Total Billable Time: 60  minutes    Precautions: Standard    Subjective     Pt reports: slight pain in the left hamstrings. The pain just stays there and I feel it with movements.      She was not issued a home exercise program.  Response to previous treatment: sore    Functional change: not really limited with functional activities.     Pain:  1-2 /10  Location: L posterior thigh     Objective       Selective Functional Movement Assessment:  FN: functional, non-painful  FP: functional, painful  DP: dysfunctional, painful  DN: dysfunctional, non-painful  Multi-Segmental Flexion: DN not able to touch toes. No back pain / HS pulling   Multi-Segmental Extension:DN   Multi-Segmental Rotation:   Right: FN  Left:    FN     FUNCTIONAL MOVEMENT BREAKOUTS: 8/1/2019  Long sitting  = DN after IASTM  SLR   -Active   R - FN  L -  DN 70  -Passive  R - NT  L - NT  Knees to chest   Active - FN  Passive - FN        FUNCTIONAL MOVEMENT BREAKOUTS: 7/30/2019  Long sitting  = DN cant touch  Toes   SLR   -Active   R - FN  L -  DN  -Passive  R - FN  L - DN  Knees to chest   Active - FN  Passive - NT     Press up  - DP      Lumbar extension / rotation   -Active  R - FN  L - FN   -Passive   R - NT   L - NT    Lumbar Locked Rotation    -Active  R - FN  L - FN  -Passive  R- NT  L - NT    Hip extension   Active  R - FN  L  "- FN   Passive   R - FN  L - FN        FLEXIBILITY  Hamstrings R 80/90    L 60/70 maintains pain in the posterior thigh.       Lea received therapeutic exercises to develop strength, ROM, flexibility and core stabilization for 30 minutes including:    Leg press 7'  Recumbent bike  Upright bike   UE ergometer  Treadmill   Elliptical       THERAPEUTIC EXERCISE  SUPINE  Dynamic  +knee to chest opp arm x10   +toy soldier x10  +scorpion x10  +DKC arms OH x10  +LTR x20 c/opp arm raise x10    -HS stretches 6" x10  -HS stretches = knee to chest 6" x 10      -scissors at 90/60/45 x12      -bridging x20 narrow base and wide base.   -lumbar lock bridge. x15  -bridge marching x10  DKTC + pulldown with orange sports cord x20     SIDELYING  -open book  x20    PRONE  -sust exten   -Quad lumbar locked rotation x15  -Quad leg lifts x15  -swimmers x15  -Quad Bird dog 2x10    STANDING.  -wall extension x15  -paloff press 2x10 OTB     SITTING  -HS stretches 2' in modified long sitting      MANUAL THERAPY: C/R of the TL PSM in long sitting. IASTM using up and down regulation strokes left hamstring.     MODALITY  DIRECT EDUCATION:         Assessment     Patient completed all activities. She maintains left HS pain however after manual interventions there was no pain especially associated with movement reassessment and breakout patterns. There is tightness in the TL PSM which may be limiting. She was able to reach further following C/R of the back extensors. Myofascial nodular tissue restrictions identified in the distal aspect of the left hamstring.      Pt prognosis is Good.     Pt will continue to benefit from skilled outpatient physical therapy to address the deficits listed in the problem list box on initial evaluation, provide pt/family education and to maximize pt's level of independence in the home and community environment.     Pt's spiritual, cultural and educational needs considered and pt agreeable to plan of care and " goals.     Anticipated barriers to physical therapy: none       Short Term GOALS: 5 weeks. Pt agrees with goals set.  1. Pt to report no pain associated with sitting    2. Pt to demonstrate spinal stability with core activation during transitional movements.   3. Patient demonstrates independence with HEP.   4. Patient demonstrates independence with Postural Awareness.   5. Patient demonstrates increased LE flexibility in the left hamstrings to functional level of 70 to 80 degrees to improve functional trunk movements       Long Term GOALS: 10 weeks. Pt agrees with goals set.  1. Patient demonstrates increased strength Bilateral gluteals to 3+/5 or greater to improve tolerance to functional activities and restore functional hip movement  2. Patient demonstrates increased trunk strength with improved trunk mobility standing.  3. Patient demonstrates improved functional trunk mobility in standing flexion and extension to improve overall function mobility associated with activity.   4. Patient demonstrates independence with body mechanics.       Plan       Plan of care Certification: 6/26/2019 to 9/26/2019.    Outpatient Physical Therapy 2 times weekly for 10 weeks to include the following interventions: Manual Therapy, Patient Education and Therapeutic Exercise.     Jem Chisholm, PT

## 2019-08-06 ENCOUNTER — CLINICAL SUPPORT (OUTPATIENT)
Dept: REHABILITATION | Facility: HOSPITAL | Age: 44
End: 2019-08-06
Payer: MEDICAID

## 2019-08-06 DIAGNOSIS — R25.9 DYSFUNCTIONAL MOVEMENTS: ICD-10-CM

## 2019-08-06 DIAGNOSIS — R29.898 DECREASED STRENGTH OF TRUNK AND BACK: ICD-10-CM

## 2019-08-06 DIAGNOSIS — R29.898 WEAKNESS OF BOTH HIPS: ICD-10-CM

## 2019-08-06 PROCEDURE — 97110 THERAPEUTIC EXERCISES: CPT | Mod: PO

## 2019-08-06 NOTE — PROGRESS NOTES
"    Physical Therapy Daily Treatment Note     Name: Lea Springer  Clinic Number: [id number]    Therapy Diagnosis:   No diagnosis found.  Physician: Raphael Rosales MD    Visit Date: 8/6/2019     Physician Orders: PT Eval and Treat back  Medical Diagnosis from Referral:   M54.16 (ICD-10-CM) - Lumbar radiculopathy  Evaluation Date: 6/26/2019  Authorization Period Expiration: 09/26/2019  Plan of Care Expiration: 9/26/2019  Visit # / Visits authorized: 2/8   NEW: 4/    Time In: 1:12 PM  Time Out: 2:00 PM  Total Billable Time: 30  minutes    Precautions: Standard    Subjective     Pt reports: today was the first day she felt normal in a month.     She was not issued a home exercise program.  Response to previous treatment: sore    Functional change: not really limited with functional activities.     Pain:  1-2 /10  Location: L posterior thigh     Objective       Lea received therapeutic exercises to develop strength, ROM, flexibility and core stabilization for 40 minutes including:    Leg press 7'  Recumbent bike  Upright bike   UE ergometer  Treadmill   Elliptical       THERAPEUTIC EXERCISE  SUPINE  Dynamic  +knee to chest opp arm x10   +toy soldier x10  +scorpion x10  +DKC arms OH x10  +LTR x20 c/opp arm raise x10    -HS stretches 6" x10  -HS stretches = knee to chest 6" x 10      -scissors at 90/60/45 x12      -bridging x20 narrow base and wide base.   -lumbar lock bridge. x15  -bridge marching x10  DKTC + pulldown with orange sports cord x20     SIDELYING  -open book  x20    PRONE  -sust exten   -Quad lumbar locked rotation x15  -Quad leg lifts x15  -swimmers x15  -Quad Bird dog 2x10    STANDING.  -wall extension x15  -paloff press 2x10 OTB     SITTING  -HS stretches 2' in modified long sitting      MANUAL THERAPY: IASTM using up and down regulation strokes left hamstring.     MODALITY  DIRECT EDUCATION:         Assessment     Patient tolerated treatment well today with no onset of adverse effects. Patient " continues with HS pain with multiple exercises such as bridging activities and scorpion however post treatment denies pain.       Pt prognosis is Good.     Pt will continue to benefit from skilled outpatient physical therapy to address the deficits listed in the problem list box on initial evaluation, provide pt/family education and to maximize pt's level of independence in the home and community environment.     Pt's spiritual, cultural and educational needs considered and pt agreeable to plan of care and goals.     Anticipated barriers to physical therapy: none       Short Term GOALS: 5 weeks. Pt agrees with goals set.  1. Pt to report no pain associated with sitting    2. Pt to demonstrate spinal stability with core activation during transitional movements.   3. Patient demonstrates independence with HEP.   4. Patient demonstrates independence with Postural Awareness.   5. Patient demonstrates increased LE flexibility in the left hamstrings to functional level of 70 to 80 degrees to improve functional trunk movements       Long Term GOALS: 10 weeks. Pt agrees with goals set.  1. Patient demonstrates increased strength Bilateral gluteals to 3+/5 or greater to improve tolerance to functional activities and restore functional hip movement  2. Patient demonstrates increased trunk strength with improved trunk mobility standing.  3. Patient demonstrates improved functional trunk mobility in standing flexion and extension to improve overall function mobility associated with activity.   4. Patient demonstrates independence with body mechanics.       Plan       Plan of care Certification: 6/26/2019 to 9/26/2019.    Outpatient Physical Therapy 2 times weekly for 10 weeks to include the following interventions: Manual Therapy, Patient Education and Therapeutic Exercise.     Brittnee Weller, MEHNAZ

## 2019-08-08 ENCOUNTER — CLINICAL SUPPORT (OUTPATIENT)
Dept: REHABILITATION | Facility: HOSPITAL | Age: 44
End: 2019-08-08
Payer: MEDICAID

## 2019-08-08 DIAGNOSIS — R25.9 DYSFUNCTIONAL MOVEMENTS: ICD-10-CM

## 2019-08-08 DIAGNOSIS — R29.898 DECREASED STRENGTH OF TRUNK AND BACK: ICD-10-CM

## 2019-08-08 DIAGNOSIS — R29.898 WEAKNESS OF BOTH HIPS: ICD-10-CM

## 2019-08-08 PROCEDURE — 97110 THERAPEUTIC EXERCISES: CPT | Mod: PO

## 2019-08-08 PROCEDURE — 97140 MANUAL THERAPY 1/> REGIONS: CPT | Mod: PO

## 2019-08-08 NOTE — PROGRESS NOTES
"    Physical Therapy Daily Treatment Note     Name: Lea Springer  Clinic Number: [id number]    Therapy Diagnosis:   Encounter Diagnoses   Name Primary?    Weakness of both hips     Dysfunctional movements     Decreased strength of trunk and back      Physician: Raphael Rosales MD    Visit Date: 8/8/2019     Physician Orders: PT Eval and Treat back  Medical Diagnosis from Referral:   M54.16 (ICD-10-CM) - Lumbar radiculopathy  Evaluation Date: 6/26/2019  Authorization Period Expiration: 09/26/2019  Plan of Care Expiration: 9/26/2019  Visit # / Visits authorized: 3/8   NEW: 5/    Time In: 11:09 AM  Time Out: 12:00 PM  Total Billable Time: 10 minutes    Precautions: Standard    Subjective     Pt reports: pain/aggravation in the upper hamstring.     She was not issued a home exercise program.  Response to previous treatment: sore    Functional change: not really limited with functional activities.     Pain:  Reports pain is tolerable but did not rate   Location: L posterior thigh     Objective       Lea received therapeutic exercises to develop strength, ROM, flexibility and core stabilization for 40 minutes including:    Leg press 7'  Recumbent bike  Upright bike   UE ergometer  Treadmill   Elliptical       THERAPEUTIC EXERCISE  SUPINE  Dynamic  +knee to chest opp arm x10   +toy soldier x10  +scorpion x10  +DKC arms OH x10  +LTR x20 c/opp arm raise x10    -HS stretches with strap 3x30   -HS stretches = knee to chest 6" x 10      -scissors at 90/60/45 x12      -bridging x20 narrow base and wide base.   -lumbar lock bridge. x15  -bridge marching x10  DKTC + pulldown with orange sports cord x20     SIDELYING  -open book  x20    PRONE  -sust exten   -Quad lumbar locked rotation x15  -Quad leg lifts x10  -swimmers x15  -Quad Bird dog 2x10    STANDING.  -wall extension x15  -paloff press 2x10 OTB     SITTING  -HS stretches 2' in modified long sitting      MANUAL THERAPY: IASTM using up and down regulation strokes left " hamstring x 8 minutes   MODALITY  DIRECT EDUCATION:       Assessment     Patient with good tolerance to today's treatment. Patient continues with difficulty with multiple exercises d/t pain however able to complete. Patient with positive response to manual therapy and reports some relief.       Pt prognosis is Good.     Pt will continue to benefit from skilled outpatient physical therapy to address the deficits listed in the problem list box on initial evaluation, provide pt/family education and to maximize pt's level of independence in the home and community environment.     Pt's spiritual, cultural and educational needs considered and pt agreeable to plan of care and goals.     Anticipated barriers to physical therapy: none       Short Term GOALS: 5 weeks. Pt agrees with goals set.  1. Pt to report no pain associated with sitting    2. Pt to demonstrate spinal stability with core activation during transitional movements.   3. Patient demonstrates independence with HEP.   4. Patient demonstrates independence with Postural Awareness.   5. Patient demonstrates increased LE flexibility in the left hamstrings to functional level of 70 to 80 degrees to improve functional trunk movements       Long Term GOALS: 10 weeks. Pt agrees with goals set.  1. Patient demonstrates increased strength Bilateral gluteals to 3+/5 or greater to improve tolerance to functional activities and restore functional hip movement  2. Patient demonstrates increased trunk strength with improved trunk mobility standing.  3. Patient demonstrates improved functional trunk mobility in standing flexion and extension to improve overall function mobility associated with activity.   4. Patient demonstrates independence with body mechanics.       Plan       Plan of care Certification: 6/26/2019 to 9/26/2019.    Outpatient Physical Therapy 2 times weekly for 10 weeks to include the following interventions: Manual Therapy, Patient Education and Therapeutic  Exercise.     Brittnee Weller, PTA

## 2019-08-13 ENCOUNTER — CLINICAL SUPPORT (OUTPATIENT)
Dept: REHABILITATION | Facility: HOSPITAL | Age: 44
End: 2019-08-13
Payer: MEDICAID

## 2019-08-13 ENCOUNTER — DOCUMENTATION ONLY (OUTPATIENT)
Dept: REHABILITATION | Facility: HOSPITAL | Age: 44
End: 2019-08-13

## 2019-08-13 DIAGNOSIS — R29.898 WEAKNESS OF BOTH HIPS: ICD-10-CM

## 2019-08-13 DIAGNOSIS — R25.9 DYSFUNCTIONAL MOVEMENTS: ICD-10-CM

## 2019-08-13 DIAGNOSIS — R29.898 DECREASED STRENGTH OF TRUNK AND BACK: ICD-10-CM

## 2019-08-13 PROCEDURE — 97110 THERAPEUTIC EXERCISES: CPT | Mod: PO

## 2019-08-13 NOTE — PROGRESS NOTES
PT/PTA met face to face to discuss pt's treatment plan and progress towards established goals. Pt will be seen by a physical therapist minimally every 6th visit or every 30 days.      Brittnee Weller PTA

## 2019-08-13 NOTE — PROGRESS NOTES
"    Physical Therapy Daily Treatment Note     Name: Lea Springer  Clinic Number: [id number]    Therapy Diagnosis:   Encounter Diagnoses   Name Primary?    Weakness of both hips     Dysfunctional movements     Decreased strength of trunk and back      Physician: Raphael Rosales MD    Visit Date: 8/13/2019     Physician Orders: PT Eval and Treat back  Medical Diagnosis from Referral:   M54.16 (ICD-10-CM) - Lumbar radiculopathy  Evaluation Date: 6/26/2019  Authorization Period Expiration: 09/26/2019  Plan of Care Expiration: 9/26/2019  Visit # / Visits authorized: 4/8   NEW: 6/    Time In: 11:16 AM  Time Out: 11:58 AM  Total Billable Time: 10 minutes    Precautions: Standard    Subjective     Pt reports: she had an increase in pain on Friday to Saturday and reports she took her anti-inflammatory and it helped reduce the pain significantly.     She was not issued a home exercise program.  Response to previous treatment: sore    Functional change: not really limited with functional activities.     Pain: 0/10   Location: L posterior thigh     Objective       Lea received therapeutic exercises to develop strength, ROM, flexibility and core stabilization for 40 minutes including:    Leg press 7'  Recumbent bike  Upright bike   UE ergometer  Treadmill   Elliptical       THERAPEUTIC EXERCISE  SUPINE  Dynamic  +knee to chest opp arm x10   +toy soldier x10  +scorpion x10  +DKC arms OH x10  +LTR c/opp arm raise x10    -HS stretches with strap 3x30   -HS stretches = knee to chest 6" x 10      -scissors at 90/60/45 x12      -bridging x20 narrow base and wide base.   -lumbar lock bridge. x15  -bridge marching x10  DKTC + pulldown with orange sports cord x20     SIDELYING  -open book  x20    PRONE  -sust exten   -Quad lumbar locked rotation x15  -Quad leg lifts x10  -swimmers x15  -Quad Bird dog 2x10    STANDING.  -wall extension x15  -paloff press 2x10 OTB     SITTING  -HS stretches 2' in modified long sitting      MANUAL " THERAPY: IASTM using up and down regulation strokes left hamstring x 0 minutes   MODALITY  DIRECT EDUCATION:       Assessment     Patient tolerated treatment well today with no onset of adverse effects. Will progress per patient's tolerance.   Pt prognosis is Good.     Pt will continue to benefit from skilled outpatient physical therapy to address the deficits listed in the problem list box on initial evaluation, provide pt/family education and to maximize pt's level of independence in the home and community environment.     Pt's spiritual, cultural and educational needs considered and pt agreeable to plan of care and goals.     Anticipated barriers to physical therapy: none       Short Term GOALS: 5 weeks. Pt agrees with goals set.  1. Pt to report no pain associated with sitting    2. Pt to demonstrate spinal stability with core activation during transitional movements.   3. Patient demonstrates independence with HEP.   4. Patient demonstrates independence with Postural Awareness.   5. Patient demonstrates increased LE flexibility in the left hamstrings to functional level of 70 to 80 degrees to improve functional trunk movements       Long Term GOALS: 10 weeks. Pt agrees with goals set.  1. Patient demonstrates increased strength Bilateral gluteals to 3+/5 or greater to improve tolerance to functional activities and restore functional hip movement  2. Patient demonstrates increased trunk strength with improved trunk mobility standing.  3. Patient demonstrates improved functional trunk mobility in standing flexion and extension to improve overall function mobility associated with activity.   4. Patient demonstrates independence with body mechanics.       Plan       Plan of care Certification: 6/26/2019 to 9/26/2019.    Outpatient Physical Therapy 2 times weekly for 10 weeks to include the following interventions: Manual Therapy, Patient Education and Therapeutic Exercise.     Brittnee Weller, PTA

## 2019-08-15 ENCOUNTER — CLINICAL SUPPORT (OUTPATIENT)
Dept: REHABILITATION | Facility: HOSPITAL | Age: 44
End: 2019-08-15
Payer: MEDICAID

## 2019-08-15 DIAGNOSIS — R25.9 DYSFUNCTIONAL MOVEMENTS: ICD-10-CM

## 2019-08-15 DIAGNOSIS — R29.898 DECREASED STRENGTH OF TRUNK AND BACK: ICD-10-CM

## 2019-08-15 DIAGNOSIS — R29.898 WEAKNESS OF BOTH HIPS: ICD-10-CM

## 2019-08-15 PROCEDURE — 97110 THERAPEUTIC EXERCISES: CPT | Mod: PO

## 2019-08-15 PROCEDURE — 97140 MANUAL THERAPY 1/> REGIONS: CPT | Mod: PO

## 2019-08-15 NOTE — PROGRESS NOTES
"    Physical Therapy Daily Treatment Note     Name: Lea Springer  Clinic Number: [id number]    Therapy Diagnosis:   Encounter Diagnoses   Name Primary?    Weakness of both hips     Dysfunctional movements     Decreased strength of trunk and back      Physician: Raphael Rosales MD    Visit Date: 8/15/2019     Physician Orders: PT Eval and Treat back  Medical Diagnosis from Referral:   M54.16 (ICD-10-CM) - Lumbar radiculopathy  Evaluation Date: 6/26/2019  Authorization Period Expiration: 09/26/2019  Plan of Care Expiration: 9/26/2019  Visit # / Visits authorized: 5/8   NEW: 7/    Time In: 11:11 AM  Time Out: 12:00 pm  Total Billable Time: 49 minutes (TE-2, MT-1)    Precautions: Standard    Subjective     Pt reports: she is doing well today , feeling about the same . Pt stated she wants to be able to sit in long sitting position without pain.   She was not issued a home exercise program.  Response to previous treatment: sore    Functional change: not really limited with functional activities.     Pain: 2/10   Location: L posterior thigh     Objective     Lea received therapeutic exercises to develop strength, ROM, flexibility and core stabilization for 40 minutes including:    Leg press 7'  Recumbent bike  Upright bike   UE ergometer  Treadmill   Elliptical       THERAPEUTIC EXERCISE  SUPINE  Dynamic  +knee to chest opp arm x10   +toy soldier x10  +scorpion x10  +DKC arms OH x12  +LTR c/opp arm raise x10    -HS stretches with strap 3x30   -HS stretches = knee to chest 6" x 10      -scissors at 90/60/45 x12      -bridging x20 narrow base and wide base.   -lumbar lock bridge. x15  -bridge marching x12  DKTC + pulldown with orange sports cord x20     SIDELYING  -open book  x20    PRONE  -sust exten   -Quad lumbar locked rotation x15  -Quad leg lifts x15  -swimmers x15  -Quad Bird dog 2x10  + Fire hydrants : x10   + Shuttle kick backs 1 red cord : x 12 B    STANDING.  -wall extension x15  -paloff press 2x10 OTB "     SITTING  -HS stretches 2' in modified long sitting      MANUAL THERAPY:STM to medial distal HS and IASTM using up and down regulation strokes left hamstring x 9 minutes   MODALITY  DIRECT EDUCATION:     Assessment     Patient tolerated treatment well today with no onset of adverse effects. Pt denoted experiencing her usual pain/discomfort while performing some exercises in her left proximal hamstring although was able to tolerate completing. Progressed with reps and addition of fire hydrants and shuttle kick backs with no pain reported. Pt was very tender with ISTYM in her distal biceps femoris which improved some following additional STM to the area.  Will progress per patient's tolerance.     Pt prognosis is Good.     Pt will continue to benefit from skilled outpatient physical therapy to address the deficits listed in the problem list box on initial evaluation, provide pt/family education and to maximize pt's level of independence in the home and community environment.   Pt's spiritual, cultural and educational needs considered and pt agreeable to plan of care and goals.     Anticipated barriers to physical therapy: none       Short Term GOALS: 5 weeks. Pt agrees with goals set.  1. Pt to report no pain associated with sitting    2. Pt to demonstrate spinal stability with core activation during transitional movements.   3. Patient demonstrates independence with HEP.   4. Patient demonstrates independence with Postural Awareness.   5. Patient demonstrates increased LE flexibility in the left hamstrings to functional level of 70 to 80 degrees to improve functional trunk movements       Long Term GOALS: 10 weeks. Pt agrees with goals set.  1. Patient demonstrates increased strength Bilateral gluteals to 3+/5 or greater to improve tolerance to functional activities and restore functional hip movement  2. Patient demonstrates increased trunk strength with improved trunk mobility standing.  3. Patient demonstrates  improved functional trunk mobility in standing flexion and extension to improve overall function mobility associated with activity.   4. Patient demonstrates independence with body mechanics.       Plan     Plan of care Certification: 6/26/2019 to 9/26/2019.    Outpatient Physical Therapy 2 times weekly for 10 weeks to include the following interventions: Manual Therapy, Patient Education and Therapeutic Exercise.     Sammi Esquivel, PTA

## 2019-08-22 ENCOUNTER — CLINICAL SUPPORT (OUTPATIENT)
Dept: REHABILITATION | Facility: HOSPITAL | Age: 44
End: 2019-08-22
Payer: MEDICAID

## 2019-08-22 DIAGNOSIS — R29.898 WEAKNESS OF BOTH HIPS: ICD-10-CM

## 2019-08-22 DIAGNOSIS — R25.9 DYSFUNCTIONAL MOVEMENTS: ICD-10-CM

## 2019-08-22 DIAGNOSIS — R29.898 DECREASED STRENGTH OF TRUNK AND BACK: ICD-10-CM

## 2019-08-22 PROCEDURE — 97110 THERAPEUTIC EXERCISES: CPT | Mod: PO | Performed by: PHYSICAL THERAPIST

## 2019-08-22 NOTE — PROGRESS NOTES
"    Physical Therapy Daily Treatment Note     Name: Lea Springer  Clinic Number: [id number]    Therapy Diagnosis:   Encounter Diagnoses   Name Primary?    Weakness of both hips     Dysfunctional movements     Decreased strength of trunk and back      Physician: Raphael Rosales MD    Visit Date: 8/22/2019     Physician Orders: PT Eval and Treat back  Medical Diagnosis from Referral:   M54.16 (ICD-10-CM) - Lumbar radiculopathy  Evaluation Date: 6/26/2019  Authorization Period Expiration: 09/26/2019  Plan of Care Expiration: 9/26/2019  Visit # / Visits authorized: 6/8       Time In: 1130 AM  Time Out: 12:10pm  Total Billable Time: 40 minutes (TE-2, MT-1)    Precautions: Standard    Subjective     Pt reports: the back is feeling good. No pain in the back only the hamstring. The more I exercise the better it feels. Says it is unchanged with therapy since it was started.   She was not issued a home exercise program.  Response to previous treatment: sore    Functional change: not really limited with functional activities.     Pain: 3 /10   Location: L posterior thigh     Objective     Long Sit = DP hamstring  SLR - FP LLE   Knees to chest - FN      Lea received therapeutic exercises to develop strength, ROM, flexibility and core stabilization for 40 minutes including:    Leg press 7'  Recumbent bike  Upright bike   UE ergometer  Treadmill   Elliptical       THERAPEUTIC EXERCISE  SUPINE  Dynamic  +knee to chest opp arm x10   +toy soldier x10  +scorpion x10  +DKC arms OH x12  +LTR c/opp arm raise x10    -HS stretches with strap 3x30   -HS stretches = knee to chest 6" x 10      -scissors at 90/60/45 x12      -bridging x20 narrow base and wide base.   -lumbar lock bridge. x15  -bridge marching x12  DKTC + pulldown with orange sports cord x20     SIDELYING  -open book  x20    PRONE / QUADRUPED  -sust exten   -Quad lumbar locked rotation x15  -Quad leg lifts x15  -swimmers x15  -Quad Bird dog 2x10  + Fire hydrants : x10 " "  + Shuttle kick backs 1 red cord : x 12 B    STANDING.  -wall extension x15  -paloff press 2x10 OTB     SITTING  -HS stretches 5" x 15       MANUAL THERAPY:Contract relax of the HS in supine. Also in long sit contract relax of the back extensors to facilitate toe touch.   MODALITY  DIRECT EDUCATION:     Assessment     Activities performed as noted. She maintains pain in the left hamstring that is isolated and not radicular. She says the pain is described as in ine spot. It is accentuated with overstretching and upon release is not reported either in long sit or supine with contract relax. Radicular pain does not demonstrate these characteristics. Also the pain is reported improved with activity. Continue to treat as isolated hamstring problem.     Pt prognosis is Good.     Pt will continue to benefit from skilled outpatient physical therapy to address the deficits listed in the problem list box on initial evaluation, provide pt/family education and to maximize pt's level of independence in the home and community environment.   Pt's spiritual, cultural and educational needs considered and pt agreeable to plan of care and goals.     Anticipated barriers to physical therapy: none       Short Term GOALS: 5 weeks. Pt agrees with goals set.  1. Pt to report no pain associated with sitting    2. Pt to demonstrate spinal stability with core activation during transitional movements.   3. Patient demonstrates independence with HEP.   4. Patient demonstrates independence with Postural Awareness.   5. Patient demonstrates increased LE flexibility in the left hamstrings to functional level of 70 to 80 degrees to improve functional trunk movements       Long Term GOALS: 10 weeks. Pt agrees with goals set.  1. Patient demonstrates increased strength Bilateral gluteals to 3+/5 or greater to improve tolerance to functional activities and restore functional hip movement  2. Patient demonstrates increased trunk strength with improved " trunk mobility standing.  3. Patient demonstrates improved functional trunk mobility in standing flexion and extension to improve overall function mobility associated with activity.   4. Patient demonstrates independence with body mechanics.       Plan     Plan of care Certification: 6/26/2019 to 9/26/2019.    Outpatient Physical Therapy 2 times weekly for 10 weeks to include the following interventions: Manual Therapy, Patient Education and Therapeutic Exercise.     Jem Chisholm, PT

## 2020-03-26 ENCOUNTER — DOCUMENTATION ONLY (OUTPATIENT)
Dept: REHABILITATION | Facility: HOSPITAL | Age: 45
End: 2020-03-26

## 2020-03-26 NOTE — PROGRESS NOTES
Outpatient Therapy Discharge Summary     Name: Lea Springer  Clinic Number: [id number]  Therapy Diagnosis:        Encounter Diagnoses   Name Primary?    Weakness of both hips      Dysfunctional movements      Decreased strength of trunk and back        Physician: Raphael Rosales MD     Visit Date: 8/22/2019      Physician Orders: PT Eval and Treat back  Medical Diagnosis from Referral:   M54.16 (ICD-10-CM) - Lumbar radiculopathy  Evaluation Date: 6/26/2019    Date of Last visit: 8/22/2019  Total Visits Received: 14    Assessment    Goals: STG MET   Short Term GOALS: 5 weeks. Pt agrees with goals set.  1. Pt to report no pain associated with sitting    2. Pt to demonstrate spinal stability with core activation during transitional movements.   3. Patient demonstrates independence with HEP.   4. Patient demonstrates independence with Postural Awareness.   5. Patient demonstrates increased LE flexibility in the left hamstrings to functional level of 70 to 80 degrees to improve functional trunk movements         Long Term GOALS: 10 weeks. Pt agrees with goals set.  1. Patient demonstrates increased strength Bilateral gluteals to 3+/5 or greater to improve tolerance to functional activities and restore functional hip movement  2. Patient demonstrates increased trunk strength with improved trunk mobility standing.  3. Patient demonstrates improved functional trunk mobility in standing flexion and extension to improve overall function mobility associated with activity.   4. Patient demonstrates independence with body mechanics.        Discharge reason: Patient had met her ST/LT goals and had reached the maximum rehab potential. She did not return to complete the remaining approved visits.     Plan   This patient is discharged from Physical Therapy    Jem Chisholm PT

## 2020-06-17 RX ORDER — MELOXICAM 15 MG/1
TABLET ORAL
Qty: 30 TABLET | Refills: 1 | OUTPATIENT
Start: 2020-06-17

## 2021-04-16 ENCOUNTER — PATIENT MESSAGE (OUTPATIENT)
Dept: RESEARCH | Facility: HOSPITAL | Age: 46
End: 2021-04-16

## 2025-07-23 ENCOUNTER — OCCUPATIONAL HEALTH (OUTPATIENT)
Dept: URGENT CARE | Facility: CLINIC | Age: 50
End: 2025-07-23

## 2025-07-23 DIAGNOSIS — Z02.83 ENCOUNTER FOR DRUG SCREENING: Primary | ICD-10-CM

## (undated) DEVICE — DRESSING LEUKOPLAST FLEX 1X3IN